# Patient Record
Sex: FEMALE | Race: WHITE | Employment: FULL TIME | URBAN - NONMETROPOLITAN AREA
[De-identification: names, ages, dates, MRNs, and addresses within clinical notes are randomized per-mention and may not be internally consistent; named-entity substitution may affect disease eponyms.]

---

## 2018-06-11 ENCOUNTER — TELEPHONE (OUTPATIENT)
Dept: ORTHOPEDIC SURGERY | Age: 63
End: 2018-06-11

## 2018-06-19 ENCOUNTER — OFFICE VISIT (OUTPATIENT)
Dept: ORTHOPEDIC SURGERY | Age: 63
End: 2018-06-19
Payer: COMMERCIAL

## 2018-06-19 VITALS
TEMPERATURE: 98.3 F | HEIGHT: 66 IN | RESPIRATION RATE: 18 BRPM | HEART RATE: 66 BPM | BODY MASS INDEX: 27.32 KG/M2 | DIASTOLIC BLOOD PRESSURE: 95 MMHG | SYSTOLIC BLOOD PRESSURE: 150 MMHG | WEIGHT: 170 LBS

## 2018-06-19 DIAGNOSIS — M17.11 PRIMARY OSTEOARTHRITIS OF RIGHT KNEE: Primary | ICD-10-CM

## 2018-06-19 PROCEDURE — 99203 OFFICE O/P NEW LOW 30 MIN: CPT | Performed by: ORTHOPAEDIC SURGERY

## 2019-02-14 ENCOUNTER — INITIAL CONSULT (OUTPATIENT)
Dept: SURGERY | Age: 64
End: 2019-02-14

## 2019-02-14 VITALS
SYSTOLIC BLOOD PRESSURE: 136 MMHG | HEIGHT: 66 IN | WEIGHT: 170 LBS | DIASTOLIC BLOOD PRESSURE: 76 MMHG | HEART RATE: 76 BPM | OXYGEN SATURATION: 95 % | BODY MASS INDEX: 27.32 KG/M2

## 2019-02-14 DIAGNOSIS — L98.7 EXCESS SKIN OF ARM: ICD-10-CM

## 2019-02-14 DIAGNOSIS — E88.1 LIPODYSTROPHY: Primary | ICD-10-CM

## 2019-02-14 PROCEDURE — MISCPX MISCPX: Performed by: PLASTIC SURGERY

## 2019-06-06 ENCOUNTER — OFFICE VISIT (OUTPATIENT)
Dept: FAMILY MEDICINE CLINIC | Age: 64
End: 2019-06-06
Payer: MEDICARE

## 2019-06-06 VITALS
HEART RATE: 74 BPM | WEIGHT: 174 LBS | OXYGEN SATURATION: 98 % | TEMPERATURE: 97.4 F | BODY MASS INDEX: 28.08 KG/M2 | DIASTOLIC BLOOD PRESSURE: 78 MMHG | SYSTOLIC BLOOD PRESSURE: 124 MMHG

## 2019-06-06 DIAGNOSIS — Z12.39 ENCOUNTER FOR OTHER SCREENING FOR MALIGNANT NEOPLASM OF BREAST: Primary | ICD-10-CM

## 2019-06-06 DIAGNOSIS — J01.90 ACUTE NON-RECURRENT SINUSITIS, UNSPECIFIED LOCATION: ICD-10-CM

## 2019-06-06 PROCEDURE — 99213 OFFICE O/P EST LOW 20 MIN: CPT | Performed by: FAMILY MEDICINE

## 2019-06-06 RX ORDER — CYCLOSPORINE 0.5 MG/ML
1 EMULSION OPHTHALMIC 2 TIMES DAILY
COMMUNITY

## 2019-06-06 RX ORDER — AZITHROMYCIN 250 MG/1
250 TABLET, FILM COATED ORAL SEE ADMIN INSTRUCTIONS
Qty: 6 TABLET | Refills: 0 | Status: SHIPPED | OUTPATIENT
Start: 2019-06-06 | End: 2019-06-11

## 2019-06-06 ASSESSMENT — PATIENT HEALTH QUESTIONNAIRE - PHQ9
SUM OF ALL RESPONSES TO PHQ9 QUESTIONS 1 & 2: 0
SUM OF ALL RESPONSES TO PHQ QUESTIONS 1-9: 0
SUM OF ALL RESPONSES TO PHQ QUESTIONS 1-9: 0
2. FEELING DOWN, DEPRESSED OR HOPELESS: 0
1. LITTLE INTEREST OR PLEASURE IN DOING THINGS: 0

## 2019-06-06 NOTE — PROGRESS NOTES
6/6/19    CC:   Chief Complaint   Patient presents with    Sinus Problem        S: patient here with one week history of sinus s/s, allergies. No fevers. OTC meds not helping. Needs rx for mammogram.    No past medical history on file. Family History   Problem Relation Age of Onset    Stroke Mother     Thyroid Disease Mother     Heart Failure Brother        Past Surgical History:   Procedure Laterality Date    APPENDECTOMY      ARM SURGERY Left     cyst    LIPOSUCTION Bilateral     Upper extremity       Social History     Tobacco Use    Smoking status: Never Smoker    Smokeless tobacco: Never Used   Substance Use Topics    Alcohol use: Yes     Comment: occasional wine    Drug use: No       ROS:  No CP, No palpitations,   No sob, No cough,   No abd pain, No heartburn,   No headaches,   No tingling, No numbness, No weakness,   No bowel changes, No hematochezia, No melena,  No bladder changes, No hematuria  No skin rashes, No skin lesions. No vision changes, No hearing changes,   No polyuria, polydipsia, polyphagia. Stable mood. ROS otherwise negative unless as listed in HPI. Chart reviewed and updated where appropriate for PMH, Fam, and Soc Hx. Physical Exam   /78   Pulse 74   Temp 97.4 °F (36.3 °C)   Wt 174 lb (78.9 kg)   SpO2 98%   BMI 28.08 kg/m²   Wt Readings from Last 3 Encounters:   06/06/19 174 lb (78.9 kg)   02/14/19 170 lb (77.1 kg)   06/19/18 170 lb (77.1 kg)       Constitutional:    She is oriented to person, place, and time. She appears well-developed and well-nourished. HENT:    Right Ear: Tympanic membrane, external ear and ear canal normal.    Left Ear: Tympanic membrane, external ear and ear canal normal.    Nose: congestion. Mouth/Throat: erythema, no exudate. Eyes:    Conjunctivae are normal.    Pupils are equal, round, and reactive to light. EOMI. Neck:    Normal range of motion. No thyromegaly or nodules noted. No bruit.   Cardiovascular: Normal rate, regular rhythm and normal heart sounds. No murmur. No gallop and no friction rub. Pulmonary/Chest:    Effort normal and breath sounds normal.    No wheezes. No rales or rhonchi. Abdominal:    Soft. Bowel sounds are normal.    No distension. No tenderness. Musculoskeletal:    Normal range of motion. No joint swelling noted. No peripheral edema. Neurological:    She is alert and oriented to person, place, and time. Motor and sensation grossly intact. Normal Gait. Skin:    Skin is warm and dry. No rashes, lesions. Psychiatric:    She has a normal mood and affect. Normal groom and dress. Current Outpatient Medications on File Prior to Visit   Medication Sig Dispense Refill    cycloSPORINE (RESTASIS) 0.05 % ophthalmic emulsion 1 drop 2 times daily      levofloxacin (LEVAQUIN) 750 MG tablet Take 750 mg by mouth daily X 14 days      acetaminophen (TYLENOL) 325 MG tablet Take 2 tablets by mouth every 6 hours as needed for Pain or Fever 120 tablet 1     No current facility-administered medications on file prior to visit. Patient Active Problem List   Diagnosis Code    Varicose veins I83.90    Cellulitis of left upper extremity L03.114    Leukocytosis D72.829    Fever R50.9    SIRS (systemic inflammatory response syndrome) (HCC) R65.10    Elevated blood pressure reading R03.0        Assessment / Price Petty was seen today for sinus problem. Diagnoses and all orders for this visit:    Encounter for other screening for malignant neoplasm of breast  -     GALI DIGITAL DIAGNOSTIC W OR WO CAD BILATERAL; Future    Acute non-recurrent sinusitis, unspecified location    Other orders  -     azithromycin (ZITHROMAX) 250 MG tablet; Take 1 tablet by mouth See Admin Instructions for 5 days 500mg on day 1 followed by 250mg on days 2 - 5  -     dexamethasone (DECADRON) 0.75 MG tablet; Take 1 tablet by mouth three times daily for 5 days    Reviewed Pmhx, meds.  Start zpak/decadron. Rx for mammogram given. Recheck one week prn. No follow-ups on file. Patient counseled to follow up sooner or seek more acute care if symptoms worsening. Electronically signed by Carrie Davis DO on 6/6/2019    This note may have been created using dictation software.  Efforts were made to reduce grammatical or syntax errors, but some may persist.

## 2019-10-08 ENCOUNTER — OFFICE VISIT (OUTPATIENT)
Dept: FAMILY MEDICINE CLINIC | Age: 64
End: 2019-10-08
Payer: MEDICARE

## 2019-10-08 VITALS
SYSTOLIC BLOOD PRESSURE: 140 MMHG | HEIGHT: 66 IN | WEIGHT: 174 LBS | BODY MASS INDEX: 27.97 KG/M2 | OXYGEN SATURATION: 97 % | HEART RATE: 97 BPM | TEMPERATURE: 98.6 F | DIASTOLIC BLOOD PRESSURE: 82 MMHG

## 2019-10-08 DIAGNOSIS — R06.02 SHORTNESS OF BREATH: Primary | ICD-10-CM

## 2019-10-08 DIAGNOSIS — E78.5 HYPERLIPIDEMIA, UNSPECIFIED HYPERLIPIDEMIA TYPE: ICD-10-CM

## 2019-10-08 DIAGNOSIS — E55.9 VITAMIN D DEFICIENCY: ICD-10-CM

## 2019-10-08 DIAGNOSIS — Z12.31 BREAST CANCER SCREENING BY MAMMOGRAM: ICD-10-CM

## 2019-10-08 DIAGNOSIS — R73.01 IMPAIRED FASTING GLUCOSE: ICD-10-CM

## 2019-10-08 DIAGNOSIS — E03.9 HYPOTHYROIDISM, UNSPECIFIED TYPE: ICD-10-CM

## 2019-10-08 DIAGNOSIS — I10 ESSENTIAL HYPERTENSION: ICD-10-CM

## 2019-10-08 DIAGNOSIS — E53.8 VITAMIN B12 DEFICIENCY: ICD-10-CM

## 2019-10-08 PROCEDURE — 1123F ACP DISCUSS/DSCN MKR DOCD: CPT | Performed by: FAMILY MEDICINE

## 2019-10-08 PROCEDURE — 4040F PNEUMOC VAC/ADMIN/RCVD: CPT | Performed by: FAMILY MEDICINE

## 2019-10-08 PROCEDURE — 3017F COLORECTAL CA SCREEN DOC REV: CPT | Performed by: FAMILY MEDICINE

## 2019-10-08 PROCEDURE — G8419 CALC BMI OUT NRM PARAM NOF/U: HCPCS | Performed by: FAMILY MEDICINE

## 2019-10-08 PROCEDURE — 90653 IIV ADJUVANT VACCINE IM: CPT | Performed by: FAMILY MEDICINE

## 2019-10-08 PROCEDURE — 1090F PRES/ABSN URINE INCON ASSESS: CPT | Performed by: FAMILY MEDICINE

## 2019-10-08 PROCEDURE — G8482 FLU IMMUNIZE ORDER/ADMIN: HCPCS | Performed by: FAMILY MEDICINE

## 2019-10-08 PROCEDURE — 1036F TOBACCO NON-USER: CPT | Performed by: FAMILY MEDICINE

## 2019-10-08 PROCEDURE — G8427 DOCREV CUR MEDS BY ELIG CLIN: HCPCS | Performed by: FAMILY MEDICINE

## 2019-10-08 PROCEDURE — G0008 ADMIN INFLUENZA VIRUS VAC: HCPCS | Performed by: FAMILY MEDICINE

## 2019-10-08 PROCEDURE — 99214 OFFICE O/P EST MOD 30 MIN: CPT | Performed by: FAMILY MEDICINE

## 2019-10-08 PROCEDURE — 93000 ELECTROCARDIOGRAM COMPLETE: CPT | Performed by: FAMILY MEDICINE

## 2019-10-08 PROCEDURE — G8400 PT W/DXA NO RESULTS DOC: HCPCS | Performed by: FAMILY MEDICINE

## 2019-10-08 RX ORDER — CHLORPHENIRAMINE MALEATE 4 MG/1
TABLET ORAL
COMMUNITY
Start: 2019-02-06 | End: 2019-10-08 | Stop reason: ALTCHOICE

## 2019-10-08 RX ORDER — TRIAMCINOLONE ACETONIDE 0.25 MG/G
CREAM TOPICAL
COMMUNITY
Start: 2019-08-14 | End: 2019-10-08 | Stop reason: ALTCHOICE

## 2019-10-08 RX ORDER — CEFDINIR 300 MG/1
CAPSULE ORAL
COMMUNITY
Start: 2019-02-06 | End: 2019-10-08 | Stop reason: ALTCHOICE

## 2019-10-08 ASSESSMENT — ENCOUNTER SYMPTOMS
CONSTIPATION: 0
DIARRHEA: 0
BACK PAIN: 0
EYE PAIN: 0
ABDOMINAL PAIN: 0
SORE THROAT: 0
SHORTNESS OF BREATH: 1
COUGH: 0
SINUS PAIN: 0

## 2019-10-14 ENCOUNTER — OFFICE VISIT (OUTPATIENT)
Dept: PODIATRY | Age: 64
End: 2019-10-14
Payer: MEDICARE

## 2019-10-14 VITALS — HEIGHT: 66 IN | BODY MASS INDEX: 28.12 KG/M2 | WEIGHT: 175 LBS | RESPIRATION RATE: 18 BRPM

## 2019-10-14 DIAGNOSIS — R26.2 DIFFICULTY WALKING: ICD-10-CM

## 2019-10-14 DIAGNOSIS — M20.42 HAMMER TOE OF LEFT FOOT: Primary | ICD-10-CM

## 2019-10-14 DIAGNOSIS — M79.672 PAIN IN LEFT FOOT: ICD-10-CM

## 2019-10-14 PROCEDURE — G8427 DOCREV CUR MEDS BY ELIG CLIN: HCPCS | Performed by: PODIATRIST

## 2019-10-14 PROCEDURE — G8400 PT W/DXA NO RESULTS DOC: HCPCS | Performed by: PODIATRIST

## 2019-10-14 PROCEDURE — 1090F PRES/ABSN URINE INCON ASSESS: CPT | Performed by: PODIATRIST

## 2019-10-14 PROCEDURE — 1036F TOBACCO NON-USER: CPT | Performed by: PODIATRIST

## 2019-10-14 PROCEDURE — G8482 FLU IMMUNIZE ORDER/ADMIN: HCPCS | Performed by: PODIATRIST

## 2019-10-14 PROCEDURE — G8419 CALC BMI OUT NRM PARAM NOF/U: HCPCS | Performed by: PODIATRIST

## 2019-10-14 PROCEDURE — 4040F PNEUMOC VAC/ADMIN/RCVD: CPT | Performed by: PODIATRIST

## 2019-10-14 PROCEDURE — 3017F COLORECTAL CA SCREEN DOC REV: CPT | Performed by: PODIATRIST

## 2019-10-14 PROCEDURE — 1123F ACP DISCUSS/DSCN MKR DOCD: CPT | Performed by: PODIATRIST

## 2019-10-14 PROCEDURE — 99213 OFFICE O/P EST LOW 20 MIN: CPT | Performed by: PODIATRIST

## 2019-12-16 ENCOUNTER — OFFICE VISIT (OUTPATIENT)
Dept: FAMILY MEDICINE CLINIC | Age: 64
End: 2019-12-16
Payer: MEDICARE

## 2019-12-16 ENCOUNTER — HOSPITAL ENCOUNTER (OUTPATIENT)
Age: 64
Discharge: HOME | End: 2019-12-18
Payer: MEDICARE

## 2019-12-16 VITALS
WEIGHT: 175.4 LBS | TEMPERATURE: 97.5 F | SYSTOLIC BLOOD PRESSURE: 150 MMHG | BODY MASS INDEX: 28.19 KG/M2 | HEIGHT: 66 IN | HEART RATE: 77 BPM | DIASTOLIC BLOOD PRESSURE: 90 MMHG | OXYGEN SATURATION: 96 %

## 2019-12-16 DIAGNOSIS — E03.9 HYPOTHYROIDISM, UNSPECIFIED TYPE: ICD-10-CM

## 2019-12-16 DIAGNOSIS — I10 ESSENTIAL HYPERTENSION: ICD-10-CM

## 2019-12-16 DIAGNOSIS — E53.8 VITAMIN B12 DEFICIENCY: ICD-10-CM

## 2019-12-16 DIAGNOSIS — E55.9 VITAMIN D DEFICIENCY: ICD-10-CM

## 2019-12-16 DIAGNOSIS — Z12.31 BREAST CANCER SCREENING BY MAMMOGRAM: Primary | ICD-10-CM

## 2019-12-16 DIAGNOSIS — E78.5 HYPERLIPIDEMIA, UNSPECIFIED HYPERLIPIDEMIA TYPE: ICD-10-CM

## 2019-12-16 DIAGNOSIS — R73.01 IMPAIRED FASTING GLUCOSE: ICD-10-CM

## 2019-12-16 DIAGNOSIS — R13.19 ESOPHAGEAL DYSPHAGIA: ICD-10-CM

## 2019-12-16 LAB
BASOPHILS ABSOLUTE: 0.11 E9/L (ref 0–0.2)
BASOPHILS RELATIVE PERCENT: 1.3 % (ref 0–2)
EOSINOPHILS ABSOLUTE: 0.33 E9/L (ref 0.05–0.5)
EOSINOPHILS RELATIVE PERCENT: 4 % (ref 0–6)
FOLATE: 14.6 NG/ML (ref 4.8–24.2)
HBA1C MFR BLD: 5.6 % (ref 4–5.6)
HCT VFR BLD CALC: 46.8 % (ref 34–48)
HEMOGLOBIN: 14.3 G/DL (ref 11.5–15.5)
IMMATURE GRANULOCYTES #: 0.01 E9/L
IMMATURE GRANULOCYTES %: 0.1 % (ref 0–5)
LYMPHOCYTES ABSOLUTE: 3.22 E9/L (ref 1.5–4)
LYMPHOCYTES RELATIVE PERCENT: 39.4 % (ref 20–42)
MCH RBC QN AUTO: 29.4 PG (ref 26–35)
MCHC RBC AUTO-ENTMCNC: 30.6 % (ref 32–34.5)
MCV RBC AUTO: 96.1 FL (ref 80–99.9)
MONOCYTES ABSOLUTE: 0.91 E9/L (ref 0.1–0.95)
MONOCYTES RELATIVE PERCENT: 11.1 % (ref 2–12)
NEUTROPHILS ABSOLUTE: 3.6 E9/L (ref 1.8–7.3)
NEUTROPHILS RELATIVE PERCENT: 44.1 % (ref 43–80)
PDW BLD-RTO: 13.5 FL (ref 11.5–15)
PLATELET # BLD: 370 E9/L (ref 130–450)
PMV BLD AUTO: 10.2 FL (ref 7–12)
RBC # BLD: 4.87 E12/L (ref 3.5–5.5)
VITAMIN B-12: 479 PG/ML (ref 211–946)
WBC # BLD: 8.2 E9/L (ref 4.5–11.5)

## 2019-12-16 PROCEDURE — 80061 LIPID PANEL: CPT

## 2019-12-16 PROCEDURE — 1123F ACP DISCUSS/DSCN MKR DOCD: CPT | Performed by: FAMILY MEDICINE

## 2019-12-16 PROCEDURE — G8482 FLU IMMUNIZE ORDER/ADMIN: HCPCS | Performed by: FAMILY MEDICINE

## 2019-12-16 PROCEDURE — 36415 COLL VENOUS BLD VENIPUNCTURE: CPT

## 2019-12-16 PROCEDURE — 1036F TOBACCO NON-USER: CPT | Performed by: FAMILY MEDICINE

## 2019-12-16 PROCEDURE — 82746 ASSAY OF FOLIC ACID SERUM: CPT

## 2019-12-16 PROCEDURE — 83036 HEMOGLOBIN GLYCOSYLATED A1C: CPT

## 2019-12-16 PROCEDURE — 82607 VITAMIN B-12: CPT

## 2019-12-16 PROCEDURE — 84550 ASSAY OF BLOOD/URIC ACID: CPT

## 2019-12-16 PROCEDURE — 83735 ASSAY OF MAGNESIUM: CPT

## 2019-12-16 PROCEDURE — 84439 ASSAY OF FREE THYROXINE: CPT

## 2019-12-16 PROCEDURE — 99214 OFFICE O/P EST MOD 30 MIN: CPT | Performed by: FAMILY MEDICINE

## 2019-12-16 PROCEDURE — 85025 COMPLETE CBC W/AUTO DIFF WBC: CPT

## 2019-12-16 PROCEDURE — 82550 ASSAY OF CK (CPK): CPT

## 2019-12-16 PROCEDURE — 1090F PRES/ABSN URINE INCON ASSESS: CPT | Performed by: FAMILY MEDICINE

## 2019-12-16 PROCEDURE — G8419 CALC BMI OUT NRM PARAM NOF/U: HCPCS | Performed by: FAMILY MEDICINE

## 2019-12-16 PROCEDURE — 4040F PNEUMOC VAC/ADMIN/RCVD: CPT | Performed by: FAMILY MEDICINE

## 2019-12-16 PROCEDURE — 80053 COMPREHEN METABOLIC PANEL: CPT

## 2019-12-16 PROCEDURE — 3017F COLORECTAL CA SCREEN DOC REV: CPT | Performed by: FAMILY MEDICINE

## 2019-12-16 PROCEDURE — G8400 PT W/DXA NO RESULTS DOC: HCPCS | Performed by: FAMILY MEDICINE

## 2019-12-16 PROCEDURE — 82306 VITAMIN D 25 HYDROXY: CPT

## 2019-12-16 PROCEDURE — 84443 ASSAY THYROID STIM HORMONE: CPT

## 2019-12-16 PROCEDURE — G8427 DOCREV CUR MEDS BY ELIG CLIN: HCPCS | Performed by: FAMILY MEDICINE

## 2019-12-16 RX ORDER — FLUTICASONE PROPIONATE 50 MCG
1 SPRAY, SUSPENSION (ML) NASAL DAILY
COMMUNITY
End: 2020-11-11

## 2019-12-16 ASSESSMENT — ENCOUNTER SYMPTOMS
EYE PAIN: 0
SORE THROAT: 0
DIARRHEA: 0
ABDOMINAL PAIN: 0
COUGH: 0
CONSTIPATION: 0
SINUS PAIN: 0
BACK PAIN: 0

## 2019-12-17 LAB
ALBUMIN SERPL-MCNC: 4.3 G/DL (ref 3.5–5.2)
ALP BLD-CCNC: 78 U/L (ref 35–104)
ALT SERPL-CCNC: 15 U/L (ref 0–32)
ANION GAP SERPL CALCULATED.3IONS-SCNC: 14 MMOL/L (ref 7–16)
AST SERPL-CCNC: 20 U/L (ref 0–31)
BILIRUB SERPL-MCNC: 0.3 MG/DL (ref 0–1.2)
BUN BLDV-MCNC: 10 MG/DL (ref 8–23)
CALCIUM SERPL-MCNC: 9.6 MG/DL (ref 8.6–10.2)
CHLORIDE BLD-SCNC: 103 MMOL/L (ref 98–107)
CHOLESTEROL, TOTAL: 216 MG/DL (ref 0–199)
CO2: 24 MMOL/L (ref 22–29)
CREAT SERPL-MCNC: 0.7 MG/DL (ref 0.5–1)
GFR AFRICAN AMERICAN: >60
GFR NON-AFRICAN AMERICAN: >60 ML/MIN/1.73
GLUCOSE BLD-MCNC: 85 MG/DL (ref 74–99)
HDLC SERPL-MCNC: 68 MG/DL
LDL CHOLESTEROL CALCULATED: 133 MG/DL (ref 0–99)
MAGNESIUM: 2.3 MG/DL (ref 1.6–2.6)
POTASSIUM SERPL-SCNC: 4 MMOL/L (ref 3.5–5)
SODIUM BLD-SCNC: 141 MMOL/L (ref 132–146)
T4 FREE: 1.07 NG/DL (ref 0.93–1.7)
TOTAL CK: 36 U/L (ref 20–180)
TOTAL PROTEIN: 7.8 G/DL (ref 6.4–8.3)
TRIGL SERPL-MCNC: 77 MG/DL (ref 0–149)
TSH SERPL DL<=0.05 MIU/L-ACNC: 3.47 UIU/ML (ref 0.27–4.2)
URIC ACID, SERUM: 4.3 MG/DL (ref 2.4–5.7)
VITAMIN D 25-HYDROXY: 24 NG/ML (ref 30–100)
VLDLC SERPL CALC-MCNC: 15 MG/DL

## 2019-12-30 ENCOUNTER — HOSPITAL ENCOUNTER (OUTPATIENT)
Dept: GENERAL RADIOLOGY | Age: 64
Discharge: HOME | End: 2020-01-01
Payer: MEDICARE

## 2019-12-30 PROCEDURE — 6370000000 HC RX 637 (ALT 250 FOR IP): Performed by: RADIOLOGY

## 2019-12-30 PROCEDURE — 74220 X-RAY XM ESOPHAGUS 1CNTRST: CPT

## 2019-12-30 PROCEDURE — 2500000003 HC RX 250 WO HCPCS: Performed by: RADIOLOGY

## 2019-12-30 RX ADMIN — ANTACID/ANTIFLATULENT 1 EACH: 380; 550; 10; 10 GRANULE, EFFERVESCENT ORAL at 12:59

## 2019-12-30 RX ADMIN — BARIUM SULFATE 176 G: 960 POWDER, FOR SUSPENSION ORAL at 12:58

## 2019-12-30 RX ADMIN — BARIUM SULFATE 140 ML: 980 POWDER, FOR SUSPENSION ORAL at 12:59

## 2020-01-03 ENCOUNTER — OFFICE VISIT (OUTPATIENT)
Dept: FAMILY MEDICINE CLINIC | Age: 65
End: 2020-01-03
Payer: MEDICARE

## 2020-01-03 VITALS
HEART RATE: 78 BPM | BODY MASS INDEX: 28.73 KG/M2 | SYSTOLIC BLOOD PRESSURE: 128 MMHG | TEMPERATURE: 97.8 F | WEIGHT: 178 LBS | DIASTOLIC BLOOD PRESSURE: 66 MMHG | OXYGEN SATURATION: 92 %

## 2020-01-03 PROCEDURE — 1123F ACP DISCUSS/DSCN MKR DOCD: CPT | Performed by: FAMILY MEDICINE

## 2020-01-03 PROCEDURE — 1090F PRES/ABSN URINE INCON ASSESS: CPT | Performed by: FAMILY MEDICINE

## 2020-01-03 PROCEDURE — G8482 FLU IMMUNIZE ORDER/ADMIN: HCPCS | Performed by: FAMILY MEDICINE

## 2020-01-03 PROCEDURE — G8417 CALC BMI ABV UP PARAM F/U: HCPCS | Performed by: FAMILY MEDICINE

## 2020-01-03 PROCEDURE — 1036F TOBACCO NON-USER: CPT | Performed by: FAMILY MEDICINE

## 2020-01-03 PROCEDURE — 99214 OFFICE O/P EST MOD 30 MIN: CPT | Performed by: FAMILY MEDICINE

## 2020-01-03 PROCEDURE — 4040F PNEUMOC VAC/ADMIN/RCVD: CPT | Performed by: FAMILY MEDICINE

## 2020-01-03 PROCEDURE — G8400 PT W/DXA NO RESULTS DOC: HCPCS | Performed by: FAMILY MEDICINE

## 2020-01-03 PROCEDURE — 3017F COLORECTAL CA SCREEN DOC REV: CPT | Performed by: FAMILY MEDICINE

## 2020-01-03 PROCEDURE — G8427 DOCREV CUR MEDS BY ELIG CLIN: HCPCS | Performed by: FAMILY MEDICINE

## 2020-01-03 RX ORDER — OMEPRAZOLE 40 MG/1
40 CAPSULE, DELAYED RELEASE ORAL
Qty: 30 CAPSULE | Refills: 5 | Status: SHIPPED
Start: 2020-01-03 | End: 2020-11-11

## 2020-01-03 ASSESSMENT — ENCOUNTER SYMPTOMS
COUGH: 0
EYE PAIN: 0
BACK PAIN: 0
SORE THROAT: 0
DIARRHEA: 0
ABDOMINAL PAIN: 0
SINUS PAIN: 0
CONSTIPATION: 0

## 2020-01-03 NOTE — PROGRESS NOTES
1/3/20    Thejanel Tucker : 1955 Sex: female  Age: 75 y.o. Chief Complaint   Patient presents with    Results       HPI:  75 y.o. female here for PE of chronic medical problems, review UGI report. Review of Systems   Constitutional: Negative for diaphoresis and fever. HENT: Negative for congestion, ear pain, sinus pain and sore throat. Eyes: Negative for pain and visual disturbance. Respiratory: Negative for cough. Cardiovascular: Negative for chest pain and palpitations. Gastrointestinal: Negative for abdominal pain, constipation and diarrhea. Endocrine: Negative for polydipsia. Genitourinary: Negative for dysuria, frequency and vaginal discharge. Musculoskeletal: Negative for arthralgias, back pain and myalgias. Skin: Negative for rash. Allergic/Immunologic: Negative for environmental allergies. Neurological: Negative for dizziness, seizures, syncope and headaches. Hematological: Negative for adenopathy. Psychiatric/Behavioral: Negative for confusion. The patient is not nervous/anxious. Current Outpatient Medications:     omeprazole (PRILOSEC) 40 MG delayed release capsule, Take 1 capsule by mouth every morning (before breakfast), Disp: 30 capsule, Rfl: 5    fluticasone (FLONASE) 50 MCG/ACT nasal spray, 1 spray by Each Nostril route daily, Disp: , Rfl:     cycloSPORINE (RESTASIS) 0.05 % ophthalmic emulsion, 1 drop 2 times daily, Disp: , Rfl:   Allergies   Allergen Reactions    Pseudoephedrine     Psudatabs [Pseudoephedrine Hcl]      Racing heart    Shellfish Allergy     Shellfish-Derived Products      Scallops         No past medical history on file.   Past Surgical History:   Procedure Laterality Date    APPENDECTOMY      ARM SURGERY Left     cyst    LIPOSUCTION Bilateral     Upper extremity     Family History   Problem Relation Age of Onset    Stroke Mother     Thyroid Disease Mother     Heart Failure Brother      Social History     Socioeconomic History    Marital status:      Spouse name: Not on file    Number of children: Not on file    Years of education: Not on file    Highest education level: Not on file   Occupational History    Not on file   Social Needs    Financial resource strain: Not on file    Food insecurity:     Worry: Not on file     Inability: Not on file    Transportation needs:     Medical: Not on file     Non-medical: Not on file   Tobacco Use    Smoking status: Never Smoker    Smokeless tobacco: Never Used   Substance and Sexual Activity    Alcohol use: Yes     Comment: occasional wine    Drug use: No    Sexual activity: Not on file   Lifestyle    Physical activity:     Days per week: Not on file     Minutes per session: Not on file    Stress: Not on file   Relationships    Social connections:     Talks on phone: Not on file     Gets together: Not on file     Attends Faith service: Not on file     Active member of club or organization: Not on file     Attends meetings of clubs or organizations: Not on file     Relationship status: Not on file    Intimate partner violence:     Fear of current or ex partner: Not on file     Emotionally abused: Not on file     Physically abused: Not on file     Forced sexual activity: Not on file   Other Topics Concern    Not on file   Social History Narrative    Not on file       Vitals:    01/03/20 1648   BP: 128/66   Pulse: 78   Temp: 97.8 °F (36.6 °C)   SpO2: 92%   Weight: 178 lb (80.7 kg)       Physical Exam  Constitutional:       Appearance: She is well-developed. HENT:      Head: Normocephalic. Right Ear: Tympanic membrane normal.      Left Ear: Tympanic membrane normal.      Nose: Nose normal.      Mouth/Throat:      Mouth: Mucous membranes are moist.      Pharynx: Oropharynx is clear. Eyes:      Extraocular Movements: Extraocular movements intact. Conjunctiva/sclera: Conjunctivae normal.      Pupils: Pupils are equal, round, and reactive to light.    Neck:

## 2020-07-13 ENCOUNTER — OFFICE VISIT (OUTPATIENT)
Dept: PODIATRY | Age: 65
End: 2020-07-13
Payer: MEDICARE

## 2020-07-13 VITALS — TEMPERATURE: 97 F | SYSTOLIC BLOOD PRESSURE: 136 MMHG | DIASTOLIC BLOOD PRESSURE: 80 MMHG

## 2020-07-13 PROBLEM — M77.42 METATARSALGIA OF LEFT FOOT: Status: ACTIVE | Noted: 2020-07-13

## 2020-07-13 PROCEDURE — G8400 PT W/DXA NO RESULTS DOC: HCPCS | Performed by: PODIATRIST

## 2020-07-13 PROCEDURE — 1123F ACP DISCUSS/DSCN MKR DOCD: CPT | Performed by: PODIATRIST

## 2020-07-13 PROCEDURE — G8417 CALC BMI ABV UP PARAM F/U: HCPCS | Performed by: PODIATRIST

## 2020-07-13 PROCEDURE — 99213 OFFICE O/P EST LOW 20 MIN: CPT | Performed by: PODIATRIST

## 2020-07-13 PROCEDURE — 4040F PNEUMOC VAC/ADMIN/RCVD: CPT | Performed by: PODIATRIST

## 2020-07-13 PROCEDURE — 1036F TOBACCO NON-USER: CPT | Performed by: PODIATRIST

## 2020-07-13 PROCEDURE — 3017F COLORECTAL CA SCREEN DOC REV: CPT | Performed by: PODIATRIST

## 2020-07-13 PROCEDURE — 1090F PRES/ABSN URINE INCON ASSESS: CPT | Performed by: PODIATRIST

## 2020-07-13 PROCEDURE — G8427 DOCREV CUR MEDS BY ELIG CLIN: HCPCS | Performed by: PODIATRIST

## 2020-07-13 NOTE — PROGRESS NOTES
20     Rosemary Aguirre    : 1955   Sex: female    Age: 75 y.o. Patient's PCP/Provider is: Zoe Hammans, DO    Subjective:  Patient is seen today for follow-up regarding recurrent issues with the left second and third digital areas. She was concerned about worsening of her hammertoe issues left foot. She denies any recent injury or change in activities. She wanted to discuss additional treatment options available at this time. Chief Complaint   Patient presents with    Toe Pain       ROS:  Const: Positives and pertinent negatives as per HPI. Musculo: Denies symptoms other than stated above. Neuro: Denies symptoms other than stated above. Skin: Denies symptoms other than stated above. Current Medications:    Current Outpatient Medications:     omeprazole (PRILOSEC) 40 MG delayed release capsule, Take 1 capsule by mouth every morning (before breakfast), Disp: 30 capsule, Rfl: 5    fluticasone (FLONASE) 50 MCG/ACT nasal spray, 1 spray by Each Nostril route daily, Disp: , Rfl:     cycloSPORINE (RESTASIS) 0.05 % ophthalmic emulsion, 1 drop 2 times daily, Disp: , Rfl:     Allergies: Allergies   Allergen Reactions    Pseudoephedrine     Psudatabs [Pseudoephedrine Hcl]      Racing heart    Shellfish Allergy     Shellfish-Derived Products      Scallops         Vitals:    20 1108   BP: 136/80   Temp: 97 °F (36.1 °C)       Exam:  Neurovascular status unchanged. Hammertoe issues noted left second third and fourth toes. Left second and third toes are symptomatic at the plantar MTPJ regions. Mild edema noted without ecchymosis plantar left second and third MTPJ regions. Mild tenderness noted dorsal PIPJ regions left second and third toes. Diagnostic Studies:     Previous x-ray studies were reviewed with patient today. Procedures:    None    Plan Per Assessment  Davide Corcoran was seen today for toe pain.     Diagnoses and all orders for this visit:    Hammer toe of left foot    Metatarsalgia of left foot    Pain in left foot    Difficulty walking      1. Evaluation and management  2. We did review x-rays with patient today. We did recommend obtaining an MRI of the left forefoot to assess for possible plantar plate issues left second/third MTPJ regions. 3. We did discuss appropriate shoe gear to utilize in the interim to allow for continued symptom improvement. 4. Patient will be followed up within 1 to 2 weeks time to review MRI results, we will discuss further treatment options available at that time. Seen By:    Sedrick Mathew DPM    Electronically signed by Sedrick Mathew DPM on 7/13/2020 at 12:00 PM    This note was created using voice recognition software. The note was reviewed however may contain grammatical errors.

## 2020-08-05 ENCOUNTER — TELEPHONE (OUTPATIENT)
Dept: PODIATRY | Age: 65
End: 2020-08-05

## 2020-08-05 NOTE — TELEPHONE ENCOUNTER
Dr. Choco Ellis recommended MRI at [facility]. The  Facility faxed paperwork over stating they have tried to reach the patient several times and have been unsuccessful. I tried calling the patient to speak with her about the MRI. She did not answer and I left a message asking her to return my call.

## 2020-08-14 ENCOUNTER — OFFICE VISIT (OUTPATIENT)
Dept: FAMILY MEDICINE CLINIC | Age: 65
End: 2020-08-14
Payer: MEDICARE

## 2020-08-14 VITALS
WEIGHT: 178 LBS | TEMPERATURE: 97.3 F | BODY MASS INDEX: 28.61 KG/M2 | HEART RATE: 72 BPM | HEIGHT: 66 IN | RESPIRATION RATE: 18 BRPM | OXYGEN SATURATION: 97 %

## 2020-08-14 PROCEDURE — G8417 CALC BMI ABV UP PARAM F/U: HCPCS | Performed by: FAMILY MEDICINE

## 2020-08-14 PROCEDURE — 3017F COLORECTAL CA SCREEN DOC REV: CPT | Performed by: FAMILY MEDICINE

## 2020-08-14 PROCEDURE — 4040F PNEUMOC VAC/ADMIN/RCVD: CPT | Performed by: FAMILY MEDICINE

## 2020-08-14 PROCEDURE — G8400 PT W/DXA NO RESULTS DOC: HCPCS | Performed by: FAMILY MEDICINE

## 2020-08-14 PROCEDURE — G8427 DOCREV CUR MEDS BY ELIG CLIN: HCPCS | Performed by: FAMILY MEDICINE

## 2020-08-14 PROCEDURE — 99213 OFFICE O/P EST LOW 20 MIN: CPT | Performed by: FAMILY MEDICINE

## 2020-08-14 PROCEDURE — 1036F TOBACCO NON-USER: CPT | Performed by: FAMILY MEDICINE

## 2020-08-14 PROCEDURE — 1090F PRES/ABSN URINE INCON ASSESS: CPT | Performed by: FAMILY MEDICINE

## 2020-08-14 PROCEDURE — 1123F ACP DISCUSS/DSCN MKR DOCD: CPT | Performed by: FAMILY MEDICINE

## 2020-08-14 PROCEDURE — 96372 THER/PROPH/DIAG INJ SC/IM: CPT | Performed by: FAMILY MEDICINE

## 2020-08-14 RX ORDER — METHYLPREDNISOLONE 4 MG/1
TABLET ORAL
Qty: 1 KIT | Refills: 0 | Status: SHIPPED
Start: 2020-08-14 | End: 2020-11-11

## 2020-08-14 RX ORDER — METHYLPREDNISOLONE ACETATE 80 MG/ML
60 INJECTION, SUSPENSION INTRA-ARTICULAR; INTRALESIONAL; INTRAMUSCULAR; SOFT TISSUE ONCE
Status: COMPLETED | OUTPATIENT
Start: 2020-08-14 | End: 2020-08-14

## 2020-08-14 RX ADMIN — METHYLPREDNISOLONE ACETATE 60 MG: 80 INJECTION, SUSPENSION INTRA-ARTICULAR; INTRALESIONAL; INTRAMUSCULAR; SOFT TISSUE at 11:10

## 2020-08-14 ASSESSMENT — ENCOUNTER SYMPTOMS: COLOR CHANGE: 1

## 2020-08-14 NOTE — PROGRESS NOTES
20  Meghna العراقي : 1955 Sex: female  Age: 75 y.o. Chief Complaint   Patient presents with   [facility]     Pt states about 2 days. This patient is a 68-year-old white female who presents today with poison ivy on her hands face and upper extremities of about 2 days duration. She was working in her yard and apparently touch the poison ivy plant with resulting poison ivy. Review of Systems   Constitutional: Negative. HENT: Negative. Cardiovascular: Negative. Skin: Positive for color change and rash. Current Outpatient Medications:     methylPREDNISolone (MEDROL DOSEPACK) 4 MG tablet, Take by mouth., Disp: 1 kit, Rfl: 0    omeprazole (PRILOSEC) 40 MG delayed release capsule, Take 1 capsule by mouth every morning (before breakfast), Disp: 30 capsule, Rfl: 5    fluticasone (FLONASE) 50 MCG/ACT nasal spray, 1 spray by Each Nostril route daily, Disp: , Rfl:     cycloSPORINE (RESTASIS) 0.05 % ophthalmic emulsion, 1 drop 2 times daily, Disp: , Rfl:   Allergies   Allergen Reactions    Pseudoephedrine     Psudatabs [Pseudoephedrine Hcl]      Racing heart    Shellfish Allergy     Shellfish-Derived Products      Scallops         No past medical history on file. Past Surgical History:   Procedure Laterality Date    APPENDECTOMY      ARM SURGERY Left     cyst    LIPOSUCTION Bilateral     Upper extremity     Family History   Problem Relation Age of Onset    Stroke Mother     Thyroid Disease Mother     Heart Failure Brother      Social History     Tobacco Use    Smoking status: Never Smoker    Smokeless tobacco: Never Used   Substance Use Topics    Alcohol use: Yes     Comment: occasional wine    Drug use: No        Vitals:    20 1057   Pulse: 72   Resp: 18   Temp: 97.3 °F (36.3 °C)   TempSrc: Temporal   SpO2: 97%   Weight: 178 lb (80.7 kg)   Height: 5' 6\" (1.676 m)       Physical Exam  Vitals signs reviewed.    Constitutional:       Appearance: Normal

## 2020-11-11 ENCOUNTER — OFFICE VISIT (OUTPATIENT)
Dept: PRIMARY CARE CLINIC | Age: 65
End: 2020-11-11
Payer: MEDICARE

## 2020-11-11 VITALS
OXYGEN SATURATION: 97 % | HEART RATE: 71 BPM | SYSTOLIC BLOOD PRESSURE: 132 MMHG | HEIGHT: 66 IN | TEMPERATURE: 97.6 F | BODY MASS INDEX: 28.28 KG/M2 | DIASTOLIC BLOOD PRESSURE: 84 MMHG | WEIGHT: 176 LBS

## 2020-11-11 PROBLEM — M20.42 HAMMER TOE OF LEFT FOOT: Status: RESOLVED | Noted: 2019-10-14 | Resolved: 2020-11-11

## 2020-11-11 PROBLEM — R26.2 DIFFICULTY WALKING: Status: RESOLVED | Noted: 2019-10-14 | Resolved: 2020-11-11

## 2020-11-11 PROBLEM — M79.672 PAIN IN LEFT FOOT: Status: RESOLVED | Noted: 2019-10-14 | Resolved: 2020-11-11

## 2020-11-11 PROBLEM — M77.42 METATARSALGIA OF LEFT FOOT: Status: RESOLVED | Noted: 2020-07-13 | Resolved: 2020-11-11

## 2020-11-11 PROCEDURE — 1123F ACP DISCUSS/DSCN MKR DOCD: CPT | Performed by: FAMILY MEDICINE

## 2020-11-11 PROCEDURE — G8400 PT W/DXA NO RESULTS DOC: HCPCS | Performed by: FAMILY MEDICINE

## 2020-11-11 PROCEDURE — 1090F PRES/ABSN URINE INCON ASSESS: CPT | Performed by: FAMILY MEDICINE

## 2020-11-11 PROCEDURE — 90732 PPSV23 VACC 2 YRS+ SUBQ/IM: CPT | Performed by: FAMILY MEDICINE

## 2020-11-11 PROCEDURE — G0009 ADMIN PNEUMOCOCCAL VACCINE: HCPCS | Performed by: FAMILY MEDICINE

## 2020-11-11 PROCEDURE — 1036F TOBACCO NON-USER: CPT | Performed by: FAMILY MEDICINE

## 2020-11-11 PROCEDURE — G8482 FLU IMMUNIZE ORDER/ADMIN: HCPCS | Performed by: FAMILY MEDICINE

## 2020-11-11 PROCEDURE — 90653 IIV ADJUVANT VACCINE IM: CPT | Performed by: FAMILY MEDICINE

## 2020-11-11 PROCEDURE — 3017F COLORECTAL CA SCREEN DOC REV: CPT | Performed by: FAMILY MEDICINE

## 2020-11-11 PROCEDURE — 4040F PNEUMOC VAC/ADMIN/RCVD: CPT | Performed by: FAMILY MEDICINE

## 2020-11-11 PROCEDURE — G0008 ADMIN INFLUENZA VIRUS VAC: HCPCS | Performed by: FAMILY MEDICINE

## 2020-11-11 PROCEDURE — G8427 DOCREV CUR MEDS BY ELIG CLIN: HCPCS | Performed by: FAMILY MEDICINE

## 2020-11-11 PROCEDURE — 99214 OFFICE O/P EST MOD 30 MIN: CPT | Performed by: FAMILY MEDICINE

## 2020-11-11 PROCEDURE — G8417 CALC BMI ABV UP PARAM F/U: HCPCS | Performed by: FAMILY MEDICINE

## 2020-11-11 RX ORDER — FAMOTIDINE 40 MG/1
40 TABLET, FILM COATED ORAL EVERY EVENING
Qty: 90 TABLET | Refills: 1 | Status: SHIPPED
Start: 2020-11-11 | End: 2021-06-03

## 2020-11-11 NOTE — PROGRESS NOTES
20  Jesus Valdez : 1955 Sex: female  Age: 77 y.o. Chief Complaint   Patient presents with    [facility]     HPI:  77 y.o. female presents today for follow up of recent worsening GERD symptoms. Former patient of Dr. Hodan Kern. Patient's chart, medical, surgical and medication history all reviewed. GERD: Patient complains of heartburn. This has been associated with chest pain, fullness after meals, heartburn, epigastric pain and symptoms primarily relate to meals, and lying down after meals. She denies abdominal bloating, belching, choking on food, deep pressure at base of neck, difficulty swallowing, hematemesis, hoarseness, melena and wheezing. Symptoms have been present for several months. She denies dysphagia. She has not lost weight. She denies melena, hematochezia, hematemesis, and coffee ground emesis. Medical therapy in the past has included proton pump inhibitors. She feels that Omeprazole made her symptoms far worse. She states she had an EGD at , but doesn't have results- told everything was OK and restarted on Omeprazole. Hasn't taken it because of previous lack of success. ROS:  Review of Systems   Constitutional: Negative for chills, fatigue and fever. Respiratory: Negative for cough, shortness of breath and wheezing. Cardiovascular: Negative for chest pain and palpitations. Gastrointestinal: Positive for abdominal pain. Negative for constipation, diarrhea, nausea and vomiting. Musculoskeletal: Negative for arthralgias and back pain. Skin: Negative for rash. Neurological: Negative for dizziness and headaches. Psychiatric/Behavioral: Negative for dysphoric mood. The patient is not nervous/anxious. All other systems reviewed and are negative.        Current Outpatient Medications on File Prior to Visit   Medication Sig Dispense Refill    cycloSPORINE (RESTASIS) 0.05 % ophthalmic emulsion 1 drop 2 times daily       No current facility-administered medications on file prior to visit. Allergies   Allergen Reactions    Pseudoephedrine     Psudatabs [Pseudoephedrine Hcl]      Racing heart    Shellfish Allergy     Shellfish-Derived Products      Scallops         History reviewed. No pertinent past medical history.   Past Surgical History:   Procedure Laterality Date    APPENDECTOMY      ARM SURGERY Left     cyst    LIPOSUCTION Bilateral     Upper extremity     Family History   Problem Relation Age of Onset    Stroke Mother     Thyroid Disease Mother     Heart Failure Brother      Social History     Socioeconomic History    Marital status:      Spouse name: Not on file    Number of children: Not on file    Years of education: Not on file    Highest education level: Not on file   Occupational History    Not on file   Social Needs    Financial resource strain: Not on file    Food insecurity     Worry: Not on file     Inability: Not on file    Transportation needs     Medical: Not on file     Non-medical: Not on file   Tobacco Use    Smoking status: Never Smoker    Smokeless tobacco: Never Used   Substance and Sexual Activity    Alcohol use: Yes     Comment: occasional wine    Drug use: No    Sexual activity: Not on file   Lifestyle    Physical activity     Days per week: Not on file     Minutes per session: Not on file    Stress: Not on file   Relationships    Social connections     Talks on phone: Not on file     Gets together: Not on file     Attends [employer] service: Not on file     Active member of club or organization: Not on file     Attends meetings of clubs or organizations: Not on file     Relationship status: Not on file    Intimate partner violence     Fear of current or ex partner: Not on file     Emotionally abused: Not on file     Physically abused: Not on file     Forced sexual activity: Not on file   Other Topics Concern    Not on file   Social History Narrative    Not on file 12/16/2019    RDW 13.5 12/16/2019    LYMPHOPCT 39.4 12/16/2019    MONOPCT 11.1 12/16/2019    BASOPCT 1.3 12/16/2019    MONOSABS 0.91 12/16/2019    LYMPHSABS 3.22 12/16/2019    EOSABS 0.33 12/16/2019    BASOSABS 0.11 12/16/2019     CMP:    Lab Results   Component Value Date     12/16/2019    K 4.0 12/16/2019     12/16/2019    CO2 24 12/16/2019    BUN 10 12/16/2019    CREATININE 0.7 12/16/2019    GFRAA >60 12/16/2019    LABGLOM >60 12/16/2019    GLUCOSE 85 12/16/2019    PROT 7.8 12/16/2019    LABALBU 4.3 12/16/2019    CALCIUM 9.6 12/16/2019    BILITOT 0.3 12/16/2019    ALKPHOS 78 12/16/2019    AST 20 12/16/2019    ALT 15 12/16/2019     HgBA1c:    Lab Results   Component Value Date    LABA1C 5.6 12/16/2019     Microalbumen/Creatinine ratio:  No components found for: RUCREAT  FLP:    Lab Results   Component Value Date    TRIG 77 12/16/2019    HDL 68 12/16/2019    LDLCALC 133 12/16/2019    LABVLDL 15 12/16/2019     TSH:    Lab Results   Component Value Date    TSH 3.470 12/16/2019        Assessment and Plan:  Angela Simpson was seen today for establish care. Diagnoses and all orders for this visit:    Gastroesophageal reflux disease without esophagitis  -     famotidine (PEPCID) 40 MG tablet; Take 1 tablet by mouth every evening  WIll get records from [facility]. Try H2 instead of PPI. If no improvement, patient to call. Need for influenza vaccination  -     INFLUENZA, TRIV, INACTIVATED, SUBUNIT, ADJUVANTED, 65 YRS AND OLDER, IM, PREFILL SYR, 0.5ML (FLUAD TRIV)    Need for pneumococcal vaccination  -     PNEUMOVAX 23 subcutaneous/IM (Pneumococcal polysaccharide vaccine 23-valent >= 1yo)        Return in about 1 year (around 11/11/2021) for AWV.       Seen By:  Kitty Beckwith DO

## 2020-11-12 ASSESSMENT — ENCOUNTER SYMPTOMS
COUGH: 0
BACK PAIN: 0
NAUSEA: 0
SHORTNESS OF BREATH: 0
VOMITING: 0
WHEEZING: 0
CONSTIPATION: 0
DIARRHEA: 0
ABDOMINAL PAIN: 1

## 2020-12-09 ENCOUNTER — TELEPHONE (OUTPATIENT)
Dept: FAMILY MEDICINE CLINIC | Age: 65
End: 2020-12-09

## 2020-12-09 NOTE — TELEPHONE ENCOUNTER
Pt would like to add hormone testing per her Dermatologist. Is the order in for her yrly labs?   [phone]

## 2020-12-28 ENCOUNTER — TELEPHONE (OUTPATIENT)
Dept: PRIMARY CARE CLINIC | Age: 65
End: 2020-12-28

## 2021-01-04 ENCOUNTER — OFFICE VISIT (OUTPATIENT)
Dept: FAMILY MEDICINE CLINIC | Age: 66
End: 2021-01-04
Payer: MEDICARE

## 2021-01-04 VITALS
HEART RATE: 76 BPM | HEIGHT: 66 IN | SYSTOLIC BLOOD PRESSURE: 126 MMHG | WEIGHT: 172 LBS | DIASTOLIC BLOOD PRESSURE: 76 MMHG | RESPIRATION RATE: 18 BRPM | BODY MASS INDEX: 27.64 KG/M2 | TEMPERATURE: 97.8 F | OXYGEN SATURATION: 97 %

## 2021-01-04 DIAGNOSIS — L65.9 HAIR LOSS: ICD-10-CM

## 2021-01-04 DIAGNOSIS — H53.8 BLURRED VISION: ICD-10-CM

## 2021-01-04 DIAGNOSIS — M19.019 ARTHRITIS OF STERNOCLAVICULAR JOINT: ICD-10-CM

## 2021-01-04 DIAGNOSIS — M79.605 BILATERAL LEG PAIN: Primary | ICD-10-CM

## 2021-01-04 DIAGNOSIS — R42 DIZZINESS: ICD-10-CM

## 2021-01-04 DIAGNOSIS — M79.604 BILATERAL LEG PAIN: Primary | ICD-10-CM

## 2021-01-04 PROCEDURE — 1090F PRES/ABSN URINE INCON ASSESS: CPT | Performed by: PHYSICIAN ASSISTANT

## 2021-01-04 PROCEDURE — 99214 OFFICE O/P EST MOD 30 MIN: CPT | Performed by: PHYSICIAN ASSISTANT

## 2021-01-04 PROCEDURE — G8482 FLU IMMUNIZE ORDER/ADMIN: HCPCS | Performed by: PHYSICIAN ASSISTANT

## 2021-01-04 PROCEDURE — 1036F TOBACCO NON-USER: CPT | Performed by: PHYSICIAN ASSISTANT

## 2021-01-04 PROCEDURE — G8427 DOCREV CUR MEDS BY ELIG CLIN: HCPCS | Performed by: PHYSICIAN ASSISTANT

## 2021-01-04 PROCEDURE — G8417 CALC BMI ABV UP PARAM F/U: HCPCS | Performed by: PHYSICIAN ASSISTANT

## 2021-01-04 PROCEDURE — G8400 PT W/DXA NO RESULTS DOC: HCPCS | Performed by: PHYSICIAN ASSISTANT

## 2021-01-04 PROCEDURE — 1123F ACP DISCUSS/DSCN MKR DOCD: CPT | Performed by: PHYSICIAN ASSISTANT

## 2021-01-04 PROCEDURE — 3017F COLORECTAL CA SCREEN DOC REV: CPT | Performed by: PHYSICIAN ASSISTANT

## 2021-01-04 PROCEDURE — 4040F PNEUMOC VAC/ADMIN/RCVD: CPT | Performed by: PHYSICIAN ASSISTANT

## 2021-01-04 NOTE — PROGRESS NOTES
Unless otherwise stated in this report the patient's positive and negative responses for review of systems for constitutional, eyes, ENT, cardiovascular, respiratory, gastrointestinal, neurological, , musculoskeletal, and integument systems and related systems to the presenting problem are either stated in the history of present illness or were not pertinent or were negative for the symptoms and/or complaints related to the presenting medical problem. Positives and pertinent negatives as per HPI. All others reviewed and are negative. PMH:   No past medical history on file. Past Surgical History:   Procedure Laterality Date    APPENDECTOMY      ARM SURGERY Left     cyst    LIPOSUCTION Bilateral     Upper extremity       Family History   Problem Relation Age of Onset    Stroke Mother     Thyroid Disease Mother     Heart Failure Brother        Medications:     Current Outpatient Medications:     famotidine (PEPCID) 40 MG tablet, Take 1 tablet by mouth every evening, Disp: 90 tablet, Rfl: 1    cycloSPORINE (RESTASIS) 0.05 % ophthalmic emulsion, 1 drop 2 times daily, Disp: , Rfl:     Allergies: Allergies   Allergen Reactions    Pseudoephedrine     Psudatabs [Pseudoephedrine Hcl]      Racing heart    Shellfish Allergy     Shellfish-Derived Products      Scallops         Social History:     Social History     Tobacco Use    Smoking status: Never Smoker    Smokeless tobacco: Never Used   Substance Use Topics    Alcohol use: Yes     Comment: occasional wine    Drug use: No       Patient lives at home. Physical Exam:     Vitals:    01/04/21 1229   BP: 126/76   Pulse: 76   Resp: 18   Temp: 97.8 °F (36.6 °C)   SpO2: 97%   Weight: 172 lb (78 kg)   Height: 5' 6\" (1.676 m)       Exam:  Physical Exam  Nurses note and vital signs reviewed and patient is not hypoxic. General: The patient appears well and in no apparent distress. Patient is resting comfortably on cart. Skin: Warm, dry, no pallor noted. There is no rash noted. Head: Normocephalic, atraumatic. Eye: Normal conjunctiva  Ears, Nose, Mouth, and Throat: Oral mucosa is moist  Cardiovascular: Regular Rate and Rhythm  Respiratory: Patient is in no distress, no accessory muscle use, lungs are clear to auscultation, no wheezing, crackles or rhonchi. Sternoclavicular area is noted to be prominent on the right side compared to the left, there is no fluctuance or induration, no erythema  Musculoskeletal: There is very minimal swelling to the right knee. The patient had no significant laxity noted. No significant tenderness. Pulses intact. No cyanosis or mottling. Neurological: A&O x4, normal speech      Testing:     Us Dup Lower Extremities Bilateral Venous    Result Date: 1/4/2021  EXAMINATION: DUPLEX VENOUS ULTRASOUND OF THE BILATERAL LOWER EXTREMITIES, 1/4/2021 12:55 pm TECHNIQUE: Duplex ultrasound and Doppler images were obtained of the bilateral lower extremities COMPARISON: None. HISTORY: ORDERING SYSTEM PROVIDED HISTORY: Bilateral leg pain TECHNOLOGIST PROVIDED HISTORY: Reason for exam:->bilateral leg pain FINDINGS: The visualized veins of the bilateral lower extremities are patent and free of echogenic thrombus. The veins are normally compressible and have normal phasic flow. No evidence of DVT in either lower extremity. Medical Decision Making:     Vital signs reviewed    Past medical history reviewed. Allergies reviewed. Medications reviewed. Patient on arrival does not appear to be in any apparent distress or discomfort. The patient will have an ultrasound of the bilateral lower extremities. The patient had ultrasound of the bilateral lower extremities that did not show any evidence of DVT in either lower extremity. The patient will continue to monitor signs symptoms. Does have a follow-up appoint with PCP on 1/19/2021. The patient also has laboratory studies that are pending at this time. The patient will get the laboratory studies performed. The patient will call with any questions or concerns. Clinical Impression:   [place] was seen today for leg pain. Diagnoses and all orders for this visit:    Bilateral leg pain  -     US DUP LOWER EXTREMITIES BILATERAL VENOUS; Future    Arthritis of sternoclavicular joint    Hair loss    Dizziness    Blurred vision        The patient is to call for any concerns or return if any of the signs or symptoms worsen. The patient is to follow-up with PCP in the next 2-3 days for repeat evaluation repeat assessment or go directly to the emergency department.      SIGNATURE: Adriana Perez III, PA-C

## 2021-01-11 DIAGNOSIS — E55.9 VITAMIN D INSUFFICIENCY: ICD-10-CM

## 2021-01-11 DIAGNOSIS — I10 ESSENTIAL HYPERTENSION: ICD-10-CM

## 2021-01-11 DIAGNOSIS — R73.01 IMPAIRED FASTING GLUCOSE: ICD-10-CM

## 2021-01-11 LAB
ALBUMIN SERPL-MCNC: 4.3 G/DL (ref 3.5–5.2)
ALP BLD-CCNC: 80 U/L (ref 35–104)
ALT SERPL-CCNC: 15 U/L (ref 0–32)
ANION GAP SERPL CALCULATED.3IONS-SCNC: 14 MMOL/L (ref 7–16)
AST SERPL-CCNC: 18 U/L (ref 0–31)
BACTERIA: ABNORMAL /HPF
BASOPHILS ABSOLUTE: 0.1 E9/L (ref 0–0.2)
BASOPHILS RELATIVE PERCENT: 1.6 % (ref 0–2)
BILIRUB SERPL-MCNC: 0.3 MG/DL (ref 0–1.2)
BILIRUBIN URINE: NEGATIVE
BLOOD, URINE: ABNORMAL
BUN BLDV-MCNC: 12 MG/DL (ref 8–23)
CALCIUM SERPL-MCNC: 9.5 MG/DL (ref 8.6–10.2)
CHLORIDE BLD-SCNC: 107 MMOL/L (ref 98–107)
CHOLESTEROL, TOTAL: 239 MG/DL (ref 0–199)
CLARITY: CLEAR
CO2: 21 MMOL/L (ref 22–29)
COLOR: YELLOW
CREAT SERPL-MCNC: 0.7 MG/DL (ref 0.5–1)
EOSINOPHILS ABSOLUTE: 0.33 E9/L (ref 0.05–0.5)
EOSINOPHILS RELATIVE PERCENT: 5.4 % (ref 0–6)
GFR AFRICAN AMERICAN: >60
GFR NON-AFRICAN AMERICAN: >60 ML/MIN/1.73
GLUCOSE BLD-MCNC: 83 MG/DL (ref 74–99)
GLUCOSE URINE: NEGATIVE MG/DL
HBA1C MFR BLD: 5.3 % (ref 4–5.6)
HCT VFR BLD CALC: 49.8 % (ref 34–48)
HDLC SERPL-MCNC: 73 MG/DL
HEMOGLOBIN: 15.5 G/DL (ref 11.5–15.5)
IMMATURE GRANULOCYTES #: 0.01 E9/L
IMMATURE GRANULOCYTES %: 0.2 % (ref 0–5)
KETONES, URINE: NEGATIVE MG/DL
LDL CHOLESTEROL CALCULATED: 146 MG/DL (ref 0–99)
LEUKOCYTE ESTERASE, URINE: ABNORMAL
LYMPHOCYTES ABSOLUTE: 2.53 E9/L (ref 1.5–4)
LYMPHOCYTES RELATIVE PERCENT: 41.3 % (ref 20–42)
MCH RBC QN AUTO: 29.9 PG (ref 26–35)
MCHC RBC AUTO-ENTMCNC: 31.1 % (ref 32–34.5)
MCV RBC AUTO: 96 FL (ref 80–99.9)
MONOCYTES ABSOLUTE: 0.85 E9/L (ref 0.1–0.95)
MONOCYTES RELATIVE PERCENT: 13.9 % (ref 2–12)
NEUTROPHILS ABSOLUTE: 2.31 E9/L (ref 1.8–7.3)
NEUTROPHILS RELATIVE PERCENT: 37.6 % (ref 43–80)
NITRITE, URINE: NEGATIVE
PDW BLD-RTO: 12.7 FL (ref 11.5–15)
PH UA: 6 (ref 5–9)
PLATELET # BLD: 359 E9/L (ref 130–450)
PMV BLD AUTO: 10.2 FL (ref 7–12)
POTASSIUM SERPL-SCNC: 4.1 MMOL/L (ref 3.5–5)
PROTEIN UA: NEGATIVE MG/DL
RBC # BLD: 5.19 E12/L (ref 3.5–5.5)
RBC UA: ABNORMAL /HPF (ref 0–2)
SODIUM BLD-SCNC: 142 MMOL/L (ref 132–146)
SPECIFIC GRAVITY UA: 1.02 (ref 1–1.03)
TOTAL PROTEIN: 7.7 G/DL (ref 6.4–8.3)
TRIGL SERPL-MCNC: 101 MG/DL (ref 0–149)
TSH SERPL DL<=0.05 MIU/L-ACNC: 3.48 UIU/ML (ref 0.27–4.2)
UROBILINOGEN, URINE: 0.2 E.U./DL
VITAMIN D 25-HYDROXY: 23 NG/ML (ref 30–100)
VLDLC SERPL CALC-MCNC: 20 MG/DL
WBC # BLD: 6.1 E9/L (ref 4.5–11.5)
WBC UA: ABNORMAL /HPF (ref 0–5)

## 2021-01-19 ENCOUNTER — OFFICE VISIT (OUTPATIENT)
Dept: PRIMARY CARE CLINIC | Age: 66
End: 2021-01-19
Payer: MEDICARE

## 2021-01-19 VITALS
DIASTOLIC BLOOD PRESSURE: 88 MMHG | WEIGHT: 175 LBS | HEART RATE: 63 BPM | TEMPERATURE: 97.8 F | HEIGHT: 66 IN | BODY MASS INDEX: 28.12 KG/M2 | SYSTOLIC BLOOD PRESSURE: 124 MMHG | OXYGEN SATURATION: 95 %

## 2021-01-19 DIAGNOSIS — L65.9 THINNING HAIR: ICD-10-CM

## 2021-01-19 DIAGNOSIS — M25.50 POLYARTHRALGIA: ICD-10-CM

## 2021-01-19 DIAGNOSIS — E55.9 VITAMIN D INSUFFICIENCY: ICD-10-CM

## 2021-01-19 DIAGNOSIS — R53.83 OTHER FATIGUE: Primary | ICD-10-CM

## 2021-01-19 PROCEDURE — 1036F TOBACCO NON-USER: CPT | Performed by: FAMILY MEDICINE

## 2021-01-19 PROCEDURE — 3017F COLORECTAL CA SCREEN DOC REV: CPT | Performed by: FAMILY MEDICINE

## 2021-01-19 PROCEDURE — 99214 OFFICE O/P EST MOD 30 MIN: CPT | Performed by: FAMILY MEDICINE

## 2021-01-19 PROCEDURE — 1123F ACP DISCUSS/DSCN MKR DOCD: CPT | Performed by: FAMILY MEDICINE

## 2021-01-19 PROCEDURE — G8400 PT W/DXA NO RESULTS DOC: HCPCS | Performed by: FAMILY MEDICINE

## 2021-01-19 PROCEDURE — 1090F PRES/ABSN URINE INCON ASSESS: CPT | Performed by: FAMILY MEDICINE

## 2021-01-19 PROCEDURE — 4040F PNEUMOC VAC/ADMIN/RCVD: CPT | Performed by: FAMILY MEDICINE

## 2021-01-19 PROCEDURE — G8482 FLU IMMUNIZE ORDER/ADMIN: HCPCS | Performed by: FAMILY MEDICINE

## 2021-01-19 PROCEDURE — G8417 CALC BMI ABV UP PARAM F/U: HCPCS | Performed by: FAMILY MEDICINE

## 2021-01-19 PROCEDURE — G8427 DOCREV CUR MEDS BY ELIG CLIN: HCPCS | Performed by: FAMILY MEDICINE

## 2021-01-19 ASSESSMENT — PATIENT HEALTH QUESTIONNAIRE - PHQ9: SUM OF ALL RESPONSES TO PHQ QUESTIONS 1-9: 0

## 2021-01-19 ASSESSMENT — ENCOUNTER SYMPTOMS
NAUSEA: 0
DIARRHEA: 0
BACK PAIN: 0
ABDOMINAL PAIN: 1
WHEEZING: 0
CONSTIPATION: 0
VOMITING: 0
COUGH: 0
SHORTNESS OF BREATH: 0

## 2021-01-19 NOTE — PROGRESS NOTES
21  Negro Hallmark : 1955 Sex: female  Age: 77 y.o. Chief Complaint   Patient presents with    Discuss Labs    Fatigue     HPI:  77 y.o. female presents today for acute visit due to new onset fatigue and R leg pain. Patient's chart, medical, surgical and medication history all reviewed. She notes symptoms started around [place]. Symptoms include thinning hair, fatigue, and severe leg pain with swelling and warmth. She was seen through [facility] on [facility] for these symptoms and had howard venous duplex US without evidence of DVT. She notes mild improvement in symptoms, but still not feeling like herself. Started taking new vitamins from Dr. Renu Todd office. ROS:  Review of Systems   Constitutional: Positive for fatigue. Negative for chills and fever. Eyes: Negative for visual disturbance. Respiratory: Negative for cough, shortness of breath and wheezing. Cardiovascular: Positive for leg swelling. Negative for chest pain and palpitations. Gastrointestinal: Positive for abdominal pain. Negative for constipation, diarrhea, nausea and vomiting. Endocrine:        Thinning hair   Musculoskeletal: Positive for arthralgias. Negative for back pain. Skin: Negative for rash. Neurological: Negative for dizziness and headaches. Psychiatric/Behavioral: Negative for dysphoric mood. The patient is not nervous/anxious. All other systems reviewed and are negative. Current Outpatient Medications on File Prior to Visit   Medication Sig Dispense Refill    famotidine (PEPCID) 40 MG tablet Take 1 tablet by mouth every evening 90 tablet 1    cycloSPORINE (RESTASIS) 0.05 % ophthalmic emulsion 1 drop 2 times daily       No current facility-administered medications on file prior to visit.         Allergies   Allergen Reactions    Pseudoephedrine     Psudatabs [Pseudoephedrine Hcl]      Racing heart    Shellfish Allergy     Shellfish-Derived Products      Scallops         History reviewed. No pertinent past medical history. Past Surgical History:   Procedure Laterality Date    APPENDECTOMY      ARM SURGERY Left     cyst    LIPOSUCTION Bilateral     Upper extremity     Family History   Problem Relation Age of Onset    Stroke Mother     Thyroid Disease Mother     Heart Failure Brother      Social History     Socioeconomic History    Marital status:      Spouse name: Not on file    Number of children: Not on file    Years of education: Not on file    Highest education level: Not on file   Occupational History    Not on file   Social Needs    Financial resource strain: Not on file    Food insecurity     Worry: Not on file     Inability: Not on file    Transportation needs     Medical: Not on file     Non-medical: Not on file   Tobacco Use    Smoking status: Never Smoker    Smokeless tobacco: Never Used   Substance and Sexual Activity    Alcohol use: Yes     Comment: occasional wine    Drug use: No    Sexual activity: Not on file   Lifestyle    Physical activity     Days per week: Not on file     Minutes per session: Not on file    Stress: Not on file   Relationships    Social connections     Talks on phone: Not on file     Gets together: Not on file     Attends Taoism service: Not on file     Active member of club or organization: Not on file     Attends meetings of clubs or organizations: Not on file     Relationship status: Not on file    Intimate partner violence     Fear of current or ex partner: Not on file     Emotionally abused: Not on file     Physically abused: Not on file     Forced sexual activity: Not on file   Other Topics Concern    Not on file   Social History Narrative    Not on file       Vitals:    01/19/21 1602   BP: 124/88   Pulse: 63   Temp: 97.8 °F (36.6 °C)   SpO2: 95%   Weight: 175 lb (79.4 kg)   Height: 5' 6\" (1.676 m)       Physical Exam:  Physical Exam  Vitals signs and nursing note reviewed.    Constitutional:       General: She is not in acute distress. Appearance: Normal appearance. She is well-developed and normal weight. She is not ill-appearing. HENT:      Head: Normocephalic and atraumatic. Right Ear: Hearing and external ear normal.      Left Ear: Hearing and external ear normal.      Nose:      Comments: Wearing mask  Eyes:      General: Lids are normal. No scleral icterus. Extraocular Movements: Extraocular movements intact. Conjunctiva/sclera: Conjunctivae normal.   Neck:      Musculoskeletal: Normal range of motion and neck supple. Thyroid: No thyromegaly. Cardiovascular:      Rate and Rhythm: Normal rate and regular rhythm. Heart sounds: Normal heart sounds. No murmur. Pulmonary:      Effort: Pulmonary effort is normal. No respiratory distress. Breath sounds: Normal breath sounds. No wheezing. Musculoskeletal: Normal range of motion. General: No tenderness or deformity. Right lower leg: No edema. Left lower leg: No edema. Lymphadenopathy:      Cervical: No cervical adenopathy. Skin:     General: Skin is warm and dry. Findings: No rash. Neurological:      General: No focal deficit present. Mental Status: She is alert and oriented to person, place, and time. Gait: Gait normal.   Psychiatric:         Mood and Affect: Mood and affect normal.         Speech: Speech normal.         Behavior: Behavior normal.         Thought Content:  Thought content normal.         Labs:  CBC with Differential:    Lab Results   Component Value Date    WBC 6.1 01/11/2021    RBC 5.19 01/11/2021    HGB 15.5 01/11/2021    HCT 49.8 01/11/2021     01/11/2021    MCV 96.0 01/11/2021    MCH 29.9 01/11/2021    MCHC 31.1 01/11/2021    RDW 12.7 01/11/2021    LYMPHOPCT 41.3 01/11/2021    MONOPCT 13.9 01/11/2021    BASOPCT 1.6 01/11/2021    MONOSABS 0.85 01/11/2021    LYMPHSABS 2.53 01/11/2021    EOSABS 0.33 01/11/2021    BASOSABS 0.10 01/11/2021     CMP:    Lab Results   Component Value Date     01/11/2021    K 4.1 01/11/2021     01/11/2021    CO2 21 01/11/2021    BUN 12 01/11/2021    CREATININE 0.7 01/11/2021    GFRAA >60 01/11/2021    LABGLOM >60 01/11/2021    GLUCOSE 83 01/11/2021    PROT 7.7 01/11/2021    LABALBU 4.3 01/11/2021    CALCIUM 9.5 01/11/2021    BILITOT 0.3 01/11/2021    ALKPHOS 80 01/11/2021    AST 18 01/11/2021    ALT 15 01/11/2021     U/A:    Lab Results   Component Value Date    COLORU Yellow 01/11/2021    PROTEINU Negative 01/11/2021    PHUR 6.0 01/11/2021    WBCUA 2-5 01/11/2021    RBCUA 1-3 01/11/2021    BACTERIA RARE 01/11/2021    CLARITYU Clear 01/11/2021    SPECGRAV 1.025 01/11/2021    LEUKOCYTESUR SMALL 01/11/2021    UROBILINOGEN 0.2 01/11/2021    BILIRUBINUR Negative 01/11/2021    BLOODU TRACE-INTACT 01/11/2021    GLUCOSEU Negative 01/11/2021    AMORPHOUS FEW 10/28/2016     HgBA1c:    Lab Results   Component Value Date    LABA1C 5.3 01/11/2021     FLP:    Lab Results   Component Value Date    TRIG 101 01/11/2021    HDL 73 01/11/2021    LDLCALC 146 01/11/2021    LABVLDL 20 01/11/2021     TSH:    Lab Results   Component Value Date    TSH 3.480 01/11/2021        Assessment and Plan:  Timmy Gomez was seen today for discuss labs and fatigue. Diagnoses and all orders for this visit:    Other fatigue  -     Sedimentation Rate; Future  -     Rheumatoid Factor; Future  -     SUNDAR; Future  -     Cyclic Citrul Peptide Antibody, IgG; Future  -     C-Reactive Protein; Future  Patient describing numerous symptoms, but none that really lead to one diagnosis. Question if underlying rheumatologic condition causing all of her symptoms. Also explained that stress/anxiety can also cause her symptoms. Will check thrum labs first.     Polyarthralgia  -     Sedimentation Rate; Future  -     Rheumatoid Factor; Future  -     SUNDAR; Future  -     Cyclic Citrul Peptide Antibody, IgG; Future  -     C-Reactive Protein; Future    Thinning hair  OK to continue vitamins for now. Vitamin D insufficiency  Low at 23. Will add extra 2000U to the 2500U already in the vitamins she is taking. Return if symptoms worsen or fail to improve.       Seen By:  Rogerio Sheppard, DO

## 2021-01-20 DIAGNOSIS — M25.50 POLYARTHRALGIA: ICD-10-CM

## 2021-01-20 DIAGNOSIS — R53.83 OTHER FATIGUE: ICD-10-CM

## 2021-01-20 LAB
C-REACTIVE PROTEIN: 0.3 MG/DL (ref 0–0.4)
RHEUMATOID FACTOR: <10 IU/ML (ref 0–13)
SEDIMENTATION RATE, ERYTHROCYTE: 2 MM/HR (ref 0–20)

## 2021-01-21 LAB — ANTI-NUCLEAR ANTIBODY (ANA): NEGATIVE

## 2021-01-22 LAB — CCP IGG ANTIBODIES: 4 UNITS (ref 0–19)

## 2021-02-25 ENCOUNTER — HOSPITAL ENCOUNTER (OUTPATIENT)
Dept: MAMMOGRAPHY | Age: 66
Discharge: HOME | End: 2021-02-27
Payer: MEDICARE

## 2021-02-25 DIAGNOSIS — Z78.0 POSTMENOPAUSAL: ICD-10-CM

## 2021-02-25 PROCEDURE — 77080 DXA BONE DENSITY AXIAL: CPT

## 2021-05-03 ENCOUNTER — TELEPHONE (OUTPATIENT)
Dept: ADMINISTRATIVE | Age: 66
End: 2021-05-03

## 2021-05-03 NOTE — TELEPHONE ENCOUNTER
Pt c/o L lower side pain which is dull, comes and goes and has been experiencing it for about 4 days. Dr's schedule is full. Please call pt with recommendations/appts.

## 2021-06-03 ENCOUNTER — OFFICE VISIT (OUTPATIENT)
Dept: PRIMARY CARE CLINIC | Age: 66
End: 2021-06-03
Payer: MEDICARE

## 2021-06-03 VITALS
SYSTOLIC BLOOD PRESSURE: 124 MMHG | HEIGHT: 66 IN | OXYGEN SATURATION: 96 % | WEIGHT: 177 LBS | TEMPERATURE: 97 F | HEART RATE: 84 BPM | BODY MASS INDEX: 28.45 KG/M2 | DIASTOLIC BLOOD PRESSURE: 72 MMHG

## 2021-06-03 DIAGNOSIS — J06.9 VIRAL URI: ICD-10-CM

## 2021-06-03 DIAGNOSIS — N32.81 OAB (OVERACTIVE BLADDER): Primary | ICD-10-CM

## 2021-06-03 DIAGNOSIS — N89.8 VAGINAL DRYNESS: ICD-10-CM

## 2021-06-03 PROCEDURE — 3017F COLORECTAL CA SCREEN DOC REV: CPT | Performed by: FAMILY MEDICINE

## 2021-06-03 PROCEDURE — G8399 PT W/DXA RESULTS DOCUMENT: HCPCS | Performed by: FAMILY MEDICINE

## 2021-06-03 PROCEDURE — 1036F TOBACCO NON-USER: CPT | Performed by: FAMILY MEDICINE

## 2021-06-03 PROCEDURE — 1090F PRES/ABSN URINE INCON ASSESS: CPT | Performed by: FAMILY MEDICINE

## 2021-06-03 PROCEDURE — 1123F ACP DISCUSS/DSCN MKR DOCD: CPT | Performed by: FAMILY MEDICINE

## 2021-06-03 PROCEDURE — 99214 OFFICE O/P EST MOD 30 MIN: CPT | Performed by: FAMILY MEDICINE

## 2021-06-03 PROCEDURE — G8427 DOCREV CUR MEDS BY ELIG CLIN: HCPCS | Performed by: FAMILY MEDICINE

## 2021-06-03 PROCEDURE — G8417 CALC BMI ABV UP PARAM F/U: HCPCS | Performed by: FAMILY MEDICINE

## 2021-06-03 PROCEDURE — 4040F PNEUMOC VAC/ADMIN/RCVD: CPT | Performed by: FAMILY MEDICINE

## 2021-06-03 RX ORDER — CONJUGATED ESTROGENS 0.62 MG/G
0.5 CREAM VAGINAL DAILY
Qty: 1 TUBE | Refills: 3 | Status: SHIPPED
Start: 2021-06-03 | End: 2021-09-14

## 2021-06-03 RX ORDER — FAMOTIDINE 40 MG/1
40 TABLET, FILM COATED ORAL DAILY
COMMUNITY
End: 2021-10-13 | Stop reason: CLARIF

## 2021-06-03 RX ORDER — OXYBUTYNIN CHLORIDE 5 MG/1
5 TABLET, EXTENDED RELEASE ORAL DAILY
Qty: 30 TABLET | Refills: 3 | Status: SHIPPED
Start: 2021-06-03 | End: 2021-10-13 | Stop reason: CLARIF

## 2021-06-03 ASSESSMENT — ENCOUNTER SYMPTOMS
SINUS PRESSURE: 0
DIARRHEA: 0
BACK PAIN: 0
EYE DISCHARGE: 0
RHINORRHEA: 1
SINUS PAIN: 0
CONSTIPATION: 0
ABDOMINAL PAIN: 0
COUGH: 1
NAUSEA: 0
VOMITING: 0
WHEEZING: 0
SHORTNESS OF BREATH: 0
SORE THROAT: 1

## 2021-06-03 NOTE — PROGRESS NOTES
6/3/21  Thu Fonseca : 1955 Sex: female  Age: 77 y.o. Chief Complaint   Patient presents with    Urinary Frequency    Sinusitis     HPI:  77 y.o. female presents for acute visit due to several complaints. Urinary frequency  Patient notes that she has been having issues with urinary frequency. Finding that she is having to urinate every 30 minutes or so. Seen by Gyn and told that she does not have any organ prolapse that would suggest a cause for her symptoms. Did state that she might need to see Urology. Also having vaginal discharge. States that it is clear liquid that \"comes out\" at any time. Instructed to use unscented Dove soap, no underwear at bedtime. Improved and then started wearing underwear to bed again and is having issues. Upper Respiratory Symptoms  Patient complains of congestion, sore throat, nasal blockage, post nasal drip, dry cough and hoarseness. Patient denies anorexia, chest pain, chills, dizziness, fatigue, myalgias, nausea and shortness of breath. She has had symptoms for a few days. Symptoms have unchanged since that time. She denies a history of asthma. She has had recent close exposure to someone with similar symptoms. Past history is significant for no history of pneumonia or bronchitis. Patient is non-smoker      ROS:  Review of Systems   Constitutional: Negative for appetite change, chills, fatigue and fever. HENT: Positive for congestion, postnasal drip, rhinorrhea and sore throat. Negative for ear pain, sinus pressure and sinus pain. Eyes: Negative for discharge and visual disturbance. Respiratory: Positive for cough. Negative for shortness of breath and wheezing. Cardiovascular: Negative for chest pain, palpitations and leg swelling. Gastrointestinal: Negative for abdominal pain, constipation, diarrhea, nausea and vomiting. Genitourinary: Positive for frequency and vaginal discharge.  Negative for difficulty urinating, dysuria, flank pain, hematuria, menstrual problem, pelvic pain and urgency. Musculoskeletal: Negative for arthralgias and back pain. Skin: Negative for rash. Neurological: Negative for dizziness and headaches. Psychiatric/Behavioral: Negative for dysphoric mood. The patient is not nervous/anxious. All other systems reviewed and are negative. Current Outpatient Medications on File Prior to Visit   Medication Sig Dispense Refill    famotidine (PEPCID) 40 MG tablet Take 40 mg by mouth daily      cycloSPORINE (RESTASIS) 0.05 % ophthalmic emulsion 1 drop 2 times daily       No current facility-administered medications on file prior to visit. Allergies   Allergen Reactions    Psudatabs [Pseudoephedrine Hcl]      Racing heart    Shellfish-Derived Products      Scallops         History reviewed. No pertinent past medical history. Past Surgical History:   Procedure Laterality Date    APPENDECTOMY      ARM SURGERY Left     cyst    LIPOSUCTION Bilateral     Upper extremity     Family History   Problem Relation Age of Onset    Stroke Mother     Thyroid Disease Mother     Heart Failure Brother      Social History     Tobacco History     Smoking Status  Never Smoker    Smokeless Tobacco Use  Never Used                 Vitals:    06/03/21 1423   BP: 124/72   Pulse: 84   Temp: 97 °F (36.1 °C)   SpO2: 96%   Weight: 177 lb (80.3 kg)   Height: 5' 6\" (1.676 m)       Physical Exam:  Physical Exam  Vitals and nursing note reviewed. Constitutional:       General: She is not in acute distress. Appearance: Normal appearance. She is well-developed and normal weight. She is not ill-appearing. HENT:      Head: Normocephalic and atraumatic. Right Ear: Hearing, ear canal and external ear normal. A middle ear effusion (serous) is present. Tympanic membrane is bulging. Left Ear: Hearing, ear canal and external ear normal. A middle ear effusion (serous) is present.  Tympanic membrane is bulging. Nose: Congestion present. No mucosal edema or rhinorrhea. Right Sinus: No maxillary sinus tenderness or frontal sinus tenderness. Left Sinus: No maxillary sinus tenderness or frontal sinus tenderness. Comments: Wearing mask     Mouth/Throat:      Pharynx: Oropharyngeal exudate (PND) and posterior oropharyngeal erythema present. Eyes:      General: Lids are normal. No scleral icterus. Right eye: No discharge. Left eye: No discharge. Extraocular Movements: Extraocular movements intact. Conjunctiva/sclera: Conjunctivae normal.   Neck:      Thyroid: No thyromegaly. Cardiovascular:      Rate and Rhythm: Normal rate and regular rhythm. Heart sounds: Normal heart sounds. No murmur heard. Pulmonary:      Effort: Pulmonary effort is normal. No respiratory distress. Breath sounds: Normal breath sounds. No wheezing. Musculoskeletal:         General: No tenderness or deformity. Normal range of motion. Cervical back: Normal range of motion and neck supple. Right lower leg: No edema. Left lower leg: No edema. Lymphadenopathy:      Cervical: No cervical adenopathy. Skin:     General: Skin is warm and dry. Findings: No rash. Neurological:      General: No focal deficit present. Mental Status: She is alert and oriented to person, place, and time. Gait: Gait normal.   Psychiatric:         Mood and Affect: Mood and affect normal.         Speech: Speech normal.         Behavior: Behavior normal.         Thought Content: Thought content normal.          Assessment and Plan:  Yesica Lazo was seen today for urinary frequency and sinusitis. Diagnoses and all orders for this visit:    OAB (overactive bladder)  -     oxybutynin (DITROPAN XL) 5 MG extended release tablet; Take 1 tablet by mouth daily  Will try drying agent first.  If no improvement, likely needs antispasmodic.       Vaginal dryness  -     conjugated estrogens (PREMARIN) 0.625 MG/GM vaginal cream; Place 0.5 g vaginally daily  Gyn tried ordering before, but patient never picked up    Viral URI  Patient's symptoms consistent with a viral illness. She is in no acute distress and VSS. Recommended that she use Flonase/Nasacort, Claritin/Zyrtec. All conservative measures including fluid hydration and hand washing also discussed. Patient to call or return for repeat exam if symptoms are worsening. Return if symptoms worsen or fail to improve.       Seen By:  Rafaela Lorenzo, DO

## 2021-06-17 ENCOUNTER — OFFICE VISIT (OUTPATIENT)
Dept: FAMILY MEDICINE CLINIC | Age: 66
End: 2021-06-17
Payer: MEDICARE

## 2021-06-17 VITALS
HEART RATE: 63 BPM | SYSTOLIC BLOOD PRESSURE: 124 MMHG | DIASTOLIC BLOOD PRESSURE: 70 MMHG | RESPIRATION RATE: 18 BRPM | TEMPERATURE: 96.6 F | OXYGEN SATURATION: 97 % | BODY MASS INDEX: 28 KG/M2 | HEIGHT: 66 IN | WEIGHT: 174.2 LBS

## 2021-06-17 DIAGNOSIS — J01.10 ACUTE NON-RECURRENT FRONTAL SINUSITIS: Primary | ICD-10-CM

## 2021-06-17 PROCEDURE — 4040F PNEUMOC VAC/ADMIN/RCVD: CPT | Performed by: FAMILY MEDICINE

## 2021-06-17 PROCEDURE — 1090F PRES/ABSN URINE INCON ASSESS: CPT | Performed by: FAMILY MEDICINE

## 2021-06-17 PROCEDURE — G8399 PT W/DXA RESULTS DOCUMENT: HCPCS | Performed by: FAMILY MEDICINE

## 2021-06-17 PROCEDURE — G8417 CALC BMI ABV UP PARAM F/U: HCPCS | Performed by: FAMILY MEDICINE

## 2021-06-17 PROCEDURE — 99213 OFFICE O/P EST LOW 20 MIN: CPT | Performed by: FAMILY MEDICINE

## 2021-06-17 PROCEDURE — 1036F TOBACCO NON-USER: CPT | Performed by: FAMILY MEDICINE

## 2021-06-17 PROCEDURE — 3017F COLORECTAL CA SCREEN DOC REV: CPT | Performed by: FAMILY MEDICINE

## 2021-06-17 PROCEDURE — 1123F ACP DISCUSS/DSCN MKR DOCD: CPT | Performed by: FAMILY MEDICINE

## 2021-06-17 PROCEDURE — G8427 DOCREV CUR MEDS BY ELIG CLIN: HCPCS | Performed by: FAMILY MEDICINE

## 2021-06-17 RX ORDER — METHYLPREDNISOLONE 4 MG/1
TABLET ORAL
Qty: 1 KIT | Refills: 0 | Status: SHIPPED
Start: 2021-06-17 | End: 2021-08-09 | Stop reason: ALTCHOICE

## 2021-06-17 RX ORDER — CETIRIZINE HYDROCHLORIDE 10 MG/1
10 TABLET ORAL DAILY
Qty: 30 TABLET | Refills: 1 | Status: SHIPPED
Start: 2021-06-17 | End: 2021-09-14

## 2021-06-17 RX ORDER — AMOXICILLIN 250 MG/1
250 CAPSULE ORAL 3 TIMES DAILY
Qty: 30 CAPSULE | Refills: 0 | Status: SHIPPED | OUTPATIENT
Start: 2021-06-17 | End: 2021-06-27

## 2021-06-17 ASSESSMENT — ENCOUNTER SYMPTOMS
SINUS PAIN: 1
EYES NEGATIVE: 1
SORE THROAT: 0
RESPIRATORY NEGATIVE: 1
SINUS PRESSURE: 1
RHINORRHEA: 1

## 2021-06-17 NOTE — PROGRESS NOTES
21  Tiny Servant : 1955 Sex: female  Age: 77 y.o. Chief Complaint   Patient presents with    Sinus Problem    Drainage    Dizziness       The 78-year-old white female with a chief complaint of sinus congestion postnasal drainage dizziness no sore throat no fever chills cough shortness of breath no loss of taste or smell she has been fully immunized for Covid. Review of Systems   Constitutional: Negative. HENT: Positive for congestion, postnasal drip, rhinorrhea, sinus pressure and sinus pain. Negative for sneezing and sore throat. Eyes: Negative. Respiratory: Negative. Cardiovascular: Negative. Current Outpatient Medications:     cetirizine (ZYRTEC) 10 MG tablet, Take 1 tablet by mouth daily, Disp: 30 tablet, Rfl: 1    methylPREDNISolone (MEDROL DOSEPACK) 4 MG tablet, Take by mouth., Disp: 1 kit, Rfl: 0    amoxicillin (AMOXIL) 250 MG capsule, Take 1 capsule by mouth 3 times daily for 10 days, Disp: 30 capsule, Rfl: 0    famotidine (PEPCID) 40 MG tablet, Take 40 mg by mouth daily, Disp: , Rfl:     conjugated estrogens (PREMARIN) 0.625 MG/GM vaginal cream, Place 0.5 g vaginally daily, Disp: 1 Tube, Rfl: 3    oxybutynin (DITROPAN XL) 5 MG extended release tablet, Take 1 tablet by mouth daily, Disp: 30 tablet, Rfl: 3    cycloSPORINE (RESTASIS) 0.05 % ophthalmic emulsion, 1 drop 2 times daily, Disp: , Rfl:   Allergies   Allergen Reactions    Psudatabs [Pseudoephedrine Hcl]      Racing heart    Shellfish-Derived Products      Scallops         No past medical history on file.   Past Surgical History:   Procedure Laterality Date    APPENDECTOMY      ARM SURGERY Left     cyst    LIPOSUCTION Bilateral     Upper extremity     Family History   Problem Relation Age of Onset    Stroke Mother     Thyroid Disease Mother     Heart Failure Brother      Social History     Tobacco Use    Smoking status: Never Smoker    Smokeless tobacco: Never Used   Substance Use Topics    Alcohol use: Yes     Comment: occasional wine    Drug use: No        Vitals:    06/17/21 1205   BP: 124/70   Pulse: 63   Resp: 18   Temp: 96.6 °F (35.9 °C)   SpO2: 97%   Weight: 174 lb 3.2 oz (79 kg)   Height: 5' 6\" (1.676 m)       Physical Exam  Vitals and nursing note reviewed. Constitutional:       Appearance: Normal appearance. HENT:      Head: Normocephalic and atraumatic. Right Ear: Tympanic membrane, ear canal and external ear normal. No middle ear effusion. There is impacted cerumen. Left Ear: Tympanic membrane, ear canal and external ear normal.  No middle ear effusion. There is no impacted cerumen. Nose: Nasal tenderness, mucosal edema, congestion and rhinorrhea present. Right Turbinates: Enlarged and swollen. Left Turbinates: Enlarged and swollen. Right Sinus: Maxillary sinus tenderness and frontal sinus tenderness present. Left Sinus: Maxillary sinus tenderness and frontal sinus tenderness present. Mouth/Throat:      Mouth: Mucous membranes are moist.      Pharynx: Oropharynx is clear. No oropharyngeal exudate or posterior oropharyngeal erythema. Eyes:      Conjunctiva/sclera: Conjunctivae normal.   Neck:      Vascular: No carotid bruit. Cardiovascular:      Rate and Rhythm: Normal rate and regular rhythm. Heart sounds: Murmur heard. Pulmonary:      Effort: Pulmonary effort is normal.      Breath sounds: Normal breath sounds. Lymphadenopathy:      Cervical: No cervical adenopathy. Neurological:      Mental Status: She is alert. Assessment and Plan:  Ranjith Peralta was seen today for sinus problem, drainage and dizziness. Diagnoses and all orders for this visit:    Acute non-recurrent frontal sinusitis    Other orders  -     cetirizine (ZYRTEC) 10 MG tablet; Take 1 tablet by mouth daily  -     methylPREDNISolone (MEDROL DOSEPACK) 4 MG tablet; Take by mouth. -     amoxicillin (AMOXIL) 250 MG capsule;  Take 1 capsule by mouth 3 times daily for 10 days        Orders Placed This Encounter   Medications    cetirizine (ZYRTEC) 10 MG tablet     Sig: Take 1 tablet by mouth daily     Dispense:  30 tablet     Refill:  1    methylPREDNISolone (MEDROL DOSEPACK) 4 MG tablet     Sig: Take by mouth. Dispense:  1 kit     Refill:  0    amoxicillin (AMOXIL) 250 MG capsule     Sig: Take 1 capsule by mouth 3 times daily for 10 days     Dispense:  30 capsule     Refill:  0        Patient advised to follow up with PCP as needed. Seen By:  Arlet Clifton, DO  There is no evidence of any vestibular problems or dizziness with movement.   Strictly sinus from which we will treat follow-up with Dr. Lindy Schuster as needed

## 2021-08-04 ENCOUNTER — TELEPHONE (OUTPATIENT)
Dept: PRIMARY CARE CLINIC | Age: 66
End: 2021-08-04

## 2021-08-04 RX ORDER — PANTOPRAZOLE SODIUM 40 MG/1
40 TABLET, DELAYED RELEASE ORAL
Qty: 90 TABLET | Refills: 1 | Status: SHIPPED
Start: 2021-08-04 | End: 2021-09-14

## 2021-08-04 NOTE — TELEPHONE ENCOUNTER
----- Message from ARNALDO RICCI [facility] sent at 8/4/2021  8:22 AM EDT -----  Subject: Appointment Request    Reason for Call: Routine (Patient Request) No Script    QUESTIONS  Type of Appointment? Established Patient  Reason for appointment request? No appointments available during search  Additional Information for Provider? pt would like to get an appt asap for   her acid reflux medication that was given is not helping   ---------------------------------------------------------------------------  --------------  CALL BACK INFO  What is the best way for the office to contact you? OK to leave message on   voicemail  Preferred Call Back Phone Number? [phone]  ---------------------------------------------------------------------------  --------------  SCRIPT ANSWERS  Relationship to Patient? Self  (Is the patient requesting to see the provider for a procedure?)? No  (Is the patient requesting to see the provider urgently  today or   tomorrow. )? No  Have you been diagnosed with, awaiting test results for, or told that you   are suspected of having COVID-19 (Coronavirus)? (If patient has tested   negative or was tested as a requirement for work, school, or travel and   not based on symptoms, answer no)? No  Do you currently have flu-like symptoms including fever or chills, cough,   shortness of breath, difficulty breathing, or new loss of taste or smell? No  Have you had close contact with someone with COVID-19 in the last 14 days? No  (Service Expert  click yes below to proceed with [facility] As Usual   Scheduling)?  Yes

## 2021-08-04 NOTE — TELEPHONE ENCOUNTER
Please advise    pt would like to get an appt asap for her acid reflux medication that was given is not helping

## 2021-08-09 ENCOUNTER — OFFICE VISIT (OUTPATIENT)
Dept: FAMILY MEDICINE CLINIC | Age: 66
End: 2021-08-09
Payer: MEDICARE

## 2021-08-09 VITALS
RESPIRATION RATE: 18 BRPM | HEART RATE: 88 BPM | TEMPERATURE: 97.7 F | WEIGHT: 174 LBS | SYSTOLIC BLOOD PRESSURE: 132 MMHG | DIASTOLIC BLOOD PRESSURE: 80 MMHG | BODY MASS INDEX: 27.97 KG/M2 | OXYGEN SATURATION: 98 % | HEIGHT: 66 IN

## 2021-08-09 DIAGNOSIS — K80.20 CALCULUS OF GALLBLADDER WITHOUT CHOLECYSTITIS WITHOUT OBSTRUCTION: ICD-10-CM

## 2021-08-09 DIAGNOSIS — M54.6 ACUTE RIGHT-SIDED THORACIC BACK PAIN: ICD-10-CM

## 2021-08-09 DIAGNOSIS — R10.11 RIGHT UPPER QUADRANT PAIN: Primary | ICD-10-CM

## 2021-08-09 LAB
BILIRUBIN, POC: NEGATIVE
BLOOD URINE, POC: NORMAL
CLARITY, POC: CLEAR
COLOR, POC: YELLOW
GLUCOSE URINE, POC: NEGATIVE
KETONES, POC: NEGATIVE
LEUKOCYTE EST, POC: NORMAL
NITRITE, POC: NEGATIVE
PH, POC: 6
PROTEIN, POC: NEGATIVE
SPECIFIC GRAVITY, POC: 1.02
UROBILINOGEN, POC: 0.2

## 2021-08-09 PROCEDURE — G8427 DOCREV CUR MEDS BY ELIG CLIN: HCPCS | Performed by: PHYSICIAN ASSISTANT

## 2021-08-09 PROCEDURE — 4040F PNEUMOC VAC/ADMIN/RCVD: CPT | Performed by: PHYSICIAN ASSISTANT

## 2021-08-09 PROCEDURE — 81002 URINALYSIS NONAUTO W/O SCOPE: CPT | Performed by: PHYSICIAN ASSISTANT

## 2021-08-09 PROCEDURE — G8417 CALC BMI ABV UP PARAM F/U: HCPCS | Performed by: PHYSICIAN ASSISTANT

## 2021-08-09 PROCEDURE — 99214 OFFICE O/P EST MOD 30 MIN: CPT | Performed by: PHYSICIAN ASSISTANT

## 2021-08-09 PROCEDURE — G8399 PT W/DXA RESULTS DOCUMENT: HCPCS | Performed by: PHYSICIAN ASSISTANT

## 2021-08-09 PROCEDURE — 1090F PRES/ABSN URINE INCON ASSESS: CPT | Performed by: PHYSICIAN ASSISTANT

## 2021-08-09 PROCEDURE — 1036F TOBACCO NON-USER: CPT | Performed by: PHYSICIAN ASSISTANT

## 2021-08-09 PROCEDURE — 3006F CXR DOC REV: CPT | Performed by: PHYSICIAN ASSISTANT

## 2021-08-09 PROCEDURE — 1123F ACP DISCUSS/DSCN MKR DOCD: CPT | Performed by: PHYSICIAN ASSISTANT

## 2021-08-09 PROCEDURE — 3017F COLORECTAL CA SCREEN DOC REV: CPT | Performed by: PHYSICIAN ASSISTANT

## 2021-08-09 RX ORDER — SUCRALFATE ORAL 1 G/10ML
1 SUSPENSION ORAL 4 TIMES DAILY
Qty: 280 ML | Refills: 0 | Status: SHIPPED
Start: 2021-08-09 | End: 2021-08-24 | Stop reason: SDUPTHER

## 2021-08-09 RX ORDER — DICYCLOMINE HCL 20 MG
20 TABLET ORAL 4 TIMES DAILY PRN
Qty: 20 TABLET | Refills: 0 | Status: SHIPPED
Start: 2021-08-09 | End: 2021-09-14

## 2021-08-09 NOTE — PROGRESS NOTES
21  Ramandeep Vilchis : 1955 Sex: female  Age 77 y.o. Subjective:  Chief Complaint   Patient presents with    Back Pain     started thursday or friday          HPI:   Ramandeep Vilchis , 77 y.o. female presents to express care for evaluation of right-sided back pain and right upper quadrant pain. HPI   64-year-old female with a history of appendectomy, esophageal stricture, reflux presents to express care for evaluation of right back pain and right upper quadrant pain. The patient has had the symptoms ongoing for the last for 5 days. The patient states that it started last Thursday or Friday. Seem to get worse over the weekend. Thought it may be related to a kidney infection. Went to [facility] express [facility] and had a urinalysis that was essentially unremarkable. The patient is not having any fevers or chills. The patient is really not having burning with urination. Patient states that he tends to get bad at night. Seems to be worse with certain foods. The patient is not having any left-sided pain. The patient has had esophageal dilation done in the past from Dr. Quin Jasmine. The patient has been taking Protonix but she does not like the side effects of the medication and it does not seem to agree with her. ROS:   Unless otherwise stated in this report the patient's positive and negative responses for review of systems for constitutional, eyes, ENT, cardiovascular, respiratory, gastrointestinal, neurological, , musculoskeletal, and integument systems and related systems to the presenting problem are either stated in the history of present illness or were not pertinent or were negative for the symptoms and/or complaints related to the presenting medical problem. Positives and pertinent negatives as per HPI. All others reviewed and are negative. PMH:   History reviewed. No pertinent past medical history.     Past Surgical History:   Procedure Laterality Date    APPENDECTOMY      ARM SURGERY Left     cyst    LIPOSUCTION Bilateral     Upper extremity       Family History   Problem Relation Age of Onset    Stroke Mother     Thyroid Disease Mother     Heart Failure Brother        Medications:     Current Outpatient Medications:     dicyclomine (BENTYL) 20 MG tablet, Take 1 tablet by mouth 4 times daily as needed (pain), Disp: 20 tablet, Rfl: 0    sucralfate (CARAFATE) 1 GM/10ML suspension, Take 10 mLs by mouth 4 times daily for 7 days, Disp: 280 mL, Rfl: 0    pantoprazole (PROTONIX) 40 MG tablet, Take 1 tablet by mouth every morning (before breakfast), Disp: 90 tablet, Rfl: 1    cetirizine (ZYRTEC) 10 MG tablet, Take 1 tablet by mouth daily, Disp: 30 tablet, Rfl: 1    famotidine (PEPCID) 40 MG tablet, Take 40 mg by mouth daily, Disp: , Rfl:     conjugated estrogens (PREMARIN) 0.625 MG/GM vaginal cream, Place 0.5 g vaginally daily, Disp: 1 Tube, Rfl: 3    oxybutynin (DITROPAN XL) 5 MG extended release tablet, Take 1 tablet by mouth daily, Disp: 30 tablet, Rfl: 3    cycloSPORINE (RESTASIS) 0.05 % ophthalmic emulsion, 1 drop 2 times daily, Disp: , Rfl:     Allergies: Allergies   Allergen Reactions    Psudatabs [Pseudoephedrine Hcl]      Racing heart    Shellfish-Derived Products      Scallops         Social History:     Social History     Tobacco Use    Smoking status: Never Smoker    Smokeless tobacco: Never Used   Substance Use Topics    Alcohol use: Yes     Comment: occasional wine    Drug use: No       Patient lives at home. Physical Exam:     Vitals:    08/09/21 1056   BP: 132/80   Pulse: 88   Resp: 18   Temp: 97.7 °F (36.5 °C)   SpO2: 98%   Weight: 174 lb (78.9 kg)   Height: 5' 6\" (1.676 m)       Exam:  Physical Exam  Nurses note and vital signs reviewed and patient is not hypoxic. General: The patient appears well and in no apparent distress. Patient is resting comfortably on cart. Skin: Warm, dry, no pallor noted.  There is no rash noted.  Head: Normocephalic, atraumatic. Eye: Normal conjunctiva  Ears, Nose, Mouth, and Throat: Oral mucosa is moist  Cardiovascular: Regular Rate and Rhythm  Respiratory: Patient is in no distress, no accessory muscle use, lungs are clear to auscultation, no wheezing, crackles or rhonchi  Back: No significant midline tenderness of the thoracic or lumbar spine, no significant left CVA tenderness, mild right CVA tenderness  GI: Normal bowel sounds, minimal right upper quadrant tenderness to palpation, no masses appreciated. No rebound, guarding, or rigidity noted. Musculoskeletal: The patient has no evidence of calf tenderness, no pitting edema, symmetrical pulses noted bilaterally  Neurological: A&O x4, normal speech        Testing:     Results for orders placed or performed in visit on 08/09/21   POCT Urinalysis no Micro   Result Value Ref Range    Color, UA yellow     Clarity, UA clear     Glucose, UA POC negative     Bilirubin, UA negative     Ketones, UA negative     Spec Grav, UA 1.025     Blood, UA POC trace     pH, UA 6.0     Protein, UA POC negative     Urobilinogen, UA 0.2     Leukocytes, UA small     Nitrite, UA negative      XR CHEST STANDARD (2 VW)    Result Date: 8/9/2021  EXAMINATION: TWO XRAY VIEWS OF THE CHEST 8/9/2021 11:40 am COMPARISON: None. HISTORY: ORDERING SYSTEM PROVIDED HISTORY: Acute right-sided thoracic back pain TECHNOLOGIST PROVIDED HISTORY: Reason for exam:->right thoracic back pain FINDINGS: The lungs are without acute focal process. There is no effusion or pneumothorax. The cardiomediastinal silhouette is without acute process. The osseous structures are without acute process. No acute process.      US ABDOMEN LIMITED    Result Date: 8/9/2021  EXAMINATION: RIGHT UPPER QUADRANT ULTRASOUND 8/9/2021 11:17 am COMPARISON: AP CT 06/26/2012 HISTORY: ORDERING SYSTEM PROVIDED HISTORY: Right upper quadrant pain TECHNOLOGIST PROVIDED HISTORY: Dull constant right upper quadrant abdominal pain. Reason for exam:->RUQ pain Specify organ?->GALLBLADDER Specify organ?->LIVER FINDINGS: No abnormality in imaged portion of pancreas. No evidence of right upper quadrant ascites. Normal-appearing liver. No ultrasound evidence of right kidney abnormality. No evidence of abdominal aortic aneurysm. Atherosclerotic wall irregularity in the imaged portion of the abdominal aorta. Normal caliber imaged portion of IVC. Multiple shadowing echogenic foci in gallbladder lumen most compatible with gallstones. Calcified gallstones noted in 2012. Nonshadowing echogenic material may be sludge. Technologist reports a negative sonographic Milner sign. No evidence of pericholecystic fluid. Gallbladder wall minimally thickened up to 3.5 mm. This is nonspecific. Normal common bile duct with 2.4 mm diameter. Cholecystolithiasis again noted. Borderline gallbladder wall thickening may reflect mild cholecystitis although there is no ultrasound evidence of pericholecystic fluid and the sonographic Milner sign is negative. Medical Decision Making:     Vital signs reviewed    Past medical history reviewed. Allergies reviewed. Medications reviewed. Patient on arrival does not appear to be in any apparent distress or discomfort. The patient has been seen and evaluated. The patient does not appear to be toxic or lethargic. The patient had chest x-ray performed and a ultrasound of the right upper quadrant. Personally reviewed the chest x-ray did not see any evidence of acute cardiopulmonary process. Ultrasound showed Lucrecia cystolithiasis. Borderline gallbladder wall thickening may reflect mild cholecystitis. The patient does have sonographic Milner sign negative. Urinalysis was unremarkable no evidence of bilirubin or urobilinogen. The patient was sent for laboratory studies. We will treat the patient with Carafate and with Bentyl. The patient will follow-up with general surgery.   We did

## 2021-08-11 ENCOUNTER — OFFICE VISIT (OUTPATIENT)
Dept: SURGERY | Age: 66
End: 2021-08-11
Payer: MEDICARE

## 2021-08-11 ENCOUNTER — TELEPHONE (OUTPATIENT)
Dept: SURGERY | Age: 66
End: 2021-08-11

## 2021-08-11 VITALS
SYSTOLIC BLOOD PRESSURE: 132 MMHG | RESPIRATION RATE: 16 BRPM | OXYGEN SATURATION: 95 % | HEIGHT: 66 IN | HEART RATE: 65 BPM | DIASTOLIC BLOOD PRESSURE: 70 MMHG | WEIGHT: 173 LBS | TEMPERATURE: 98.5 F | BODY MASS INDEX: 27.8 KG/M2

## 2021-08-11 DIAGNOSIS — K58.2 IRRITABLE BOWEL SYNDROME WITH BOTH CONSTIPATION AND DIARRHEA: ICD-10-CM

## 2021-08-11 DIAGNOSIS — R10.9 RIGHT FLANK PAIN: Primary | ICD-10-CM

## 2021-08-11 PROCEDURE — 1036F TOBACCO NON-USER: CPT | Performed by: SURGERY

## 2021-08-11 PROCEDURE — G8417 CALC BMI ABV UP PARAM F/U: HCPCS | Performed by: SURGERY

## 2021-08-11 PROCEDURE — 1123F ACP DISCUSS/DSCN MKR DOCD: CPT | Performed by: SURGERY

## 2021-08-11 PROCEDURE — 4040F PNEUMOC VAC/ADMIN/RCVD: CPT | Performed by: SURGERY

## 2021-08-11 PROCEDURE — 1090F PRES/ABSN URINE INCON ASSESS: CPT | Performed by: SURGERY

## 2021-08-11 PROCEDURE — 3017F COLORECTAL CA SCREEN DOC REV: CPT | Performed by: SURGERY

## 2021-08-11 PROCEDURE — 99203 OFFICE O/P NEW LOW 30 MIN: CPT | Performed by: SURGERY

## 2021-08-11 PROCEDURE — G8427 DOCREV CUR MEDS BY ELIG CLIN: HCPCS | Performed by: SURGERY

## 2021-08-11 PROCEDURE — G8399 PT W/DXA RESULTS DOCUMENT: HCPCS | Performed by: SURGERY

## 2021-08-11 NOTE — PROGRESS NOTES
111 [facility] Surgery Clinic Note    Assessment/Plan:      Diagnosis Orders   1. Right flank pain with radiation to right abdomen  CT ABDOMEN PELVIS WO CONTRAST Additional Contrast? None    Discussed may or may not be gallbladder related. Will check a CT scan for other etiology   2. GERD; irritable bowel syndrome with both constipation and diarrhea      She reports intolerance to PPI but is currently on Protonix, Bentyl, and Carafate. Continue those. She follows with Dr. Ghazala Thomas, defer further to him         Return for Results. Chief Complaint   Patient presents with    New Patient     ref by Dinorah Guillory for cholecystitis. PCP: Ольга Guzmán DO    HPI: Jose Garvey is a 77 y.o. female who presents in consultation for right flank pain. Begins on her mid back on the right and radiates to her right upper quadrant. She says this is constant with a dull ache but sometimes will exacerbate. Not specifically related to food. It is sometimes very severe. She had normal LFTs recently. She complains of abdominal problems for \"months. \"  She also complains of acid reflux. She sees Dr. Ghazala Thomas. She has had EGD and dilation in the past.  She says she has stomach upset when she takes PPI but she is currently on Protonix, Carafate, and Bentyl. She also notes alternating diarrhea and constipation. Her LFTs recently were okay. She does complain of some chills occasionally. She had ultrasound showing gallstones. Her abdomen is benign to palpation today but she reports just a constant \"dull ache. \"      History reviewed. No pertinent past medical history. Past Surgical History:   Procedure Laterality Date    APPENDECTOMY      ARM SURGERY Left     cyst    LIPOSUCTION Bilateral     Upper extremity       Prior to Admission medications    Medication Sig Start Date End Date Taking?  Authorizing Provider   dicyclomine (BENTYL) 20 MG tablet Take 1 tablet by mouth 4 times daily as needed (pain) 8/9/21 8/14/21 Yes ALLYSON Michael III   sucralfate (CARAFATE) 1 GM/10ML suspension Take 10 mLs by mouth 4 times daily for 7 days 8/9/21 8/16/21 Yes ALLYSON Michael III   pantoprazole (PROTONIX) 40 MG tablet Take 1 tablet by mouth every morning (before breakfast) 8/4/21  Yes Manjula العلي,    cetirizine (ZYRTEC) 10 MG tablet Take 1 tablet by mouth daily 6/17/21  Yes Margo Espinoza, DO   famotidine (PEPCID) 40 MG tablet Take 40 mg by mouth daily   Yes Historical Provider, MD   conjugated estrogens (PREMARIN) 0.625 MG/GM vaginal cream Place 0.5 g vaginally daily 6/3/21  Yes Marleen Cunningham DO   oxybutynin (DITROPAN XL) 5 MG extended release tablet Take 1 tablet by mouth daily 6/3/21  Yes Marleen Cunningham DO   cycloSPORINE (RESTASIS) 0.05 % ophthalmic emulsion 1 drop 2 times daily   Yes Historical Provider, MD       Allergies   Allergen Reactions    Psudatabs [Pseudoephedrine Hcl]      Racing heart    Shellfish-Derived Products      Scallops         Social History     Socioeconomic History    Marital status:      Spouse name: None    Number of children: None    Years of education: None    Highest education level: None   Occupational History    None   Tobacco Use    Smoking status: Never Smoker    Smokeless tobacco: Never Used   Substance and Sexual Activity    Alcohol use: Yes     Comment: occasional wine    Drug use: No    Sexual activity: None   Other Topics Concern    None   Social History Narrative    None     Social Determinants of Health     Financial Resource Strain:     Difficulty of Paying Living Expenses:    Food Insecurity:     Worried About Running Out of Food in the Last Year:     Ran Out of Food in the Last Year:    Transportation Needs:     Lack of Transportation (Medical):      Lack of Transportation (Non-Medical):    Physical Activity:     Days of Exercise per Week:     Minutes of Exercise per Session:    Stress:     Feeling of Stress :    Social Connections:     Frequency of Communication with Friends and Family:     Frequency of Social Gatherings with Friends and Family:     Attends Congregation Services:     Active Member of Clubs or Organizations:     Attends Club or Organization Meetings:     Marital Status:    Intimate Partner Violence:     Fear of Current or Ex-Partner:     Emotionally Abused:     Physically Abused:     Sexually Abused:        Family History   Problem Relation Age of Onset    Stroke Mother     Thyroid Disease Mother     Heart Failure Brother        Review of Systems   All other systems reviewed and are negative. Objective:  Vitals:    08/11/21 0938   BP: 132/70   Pulse: 65   Resp: 16   Temp: 98.5 °F (36.9 °C)   TempSrc: Temporal   SpO2: 95%   Weight: 173 lb (78.5 kg)   Height: 5' 6\" (1.676 m)          Physical Exam  HENT:      Head: Normocephalic and atraumatic. Eyes:      General:         Right eye: No discharge. Left eye: No discharge. Neck:      Trachea: No tracheal deviation. Cardiovascular:      Rate and Rhythm: Normal rate. Pulmonary:      Effort: Pulmonary effort is normal. No respiratory distress. Abdominal:      General: There is no distension. Palpations: Abdomen is soft. Tenderness: There is no abdominal tenderness. There is no guarding or rebound. Skin:     General: Skin is warm and dry. Neurological:      Mental Status: She is alert and oriented to person, place, and time. Shruthi Fofana MD  8/11/2021    NOTE: This report, in part or full,may have been transcribed using voice recognition software. Every effort was made to ensure accuracy; however, inadvertent computerized transcription errors may be present. Please excuse any transcriptional grammatical or spelling errors that may have escaped my editorial review.     CC: Omer Manning DO, AH Davis III

## 2021-08-11 NOTE — TELEPHONE ENCOUNTER
Called and spoke with patient to return their message regarding their CT scan appointment. Pt is scheduled for a CT abdomen and pelvis w/o contrast on 09/01/2021 @ 4pm in [facility]. Gave patient directions for NPO 4 hours prior to surgery and to arrive at 3:45pm. Pt verbalized understanding.    Electronically signed by Shannan Garcia MA on 8/11/2021 at 4:06 PM

## 2021-08-18 ENCOUNTER — TELEPHONE (OUTPATIENT)
Dept: SURGERY | Age: 66
End: 2021-08-18

## 2021-08-18 NOTE — TELEPHONE ENCOUNTER
Patient called and wanted the order for CT scan abdomen pelvis faxed to [facility] because they could get her in sooner. MA faxed order to [phone].     Electronically signed by Amelia Martin MA on 8/18/2021 at 3:56 PM

## 2021-08-24 RX ORDER — SUCRALFATE ORAL 1 G/10ML
1 SUSPENSION ORAL 4 TIMES DAILY
Qty: 280 ML | Refills: 0 | Status: SHIPPED
Start: 2021-08-24 | End: 2021-09-14

## 2021-08-26 ENCOUNTER — OFFICE VISIT (OUTPATIENT)
Dept: SURGERY | Age: 66
End: 2021-08-26
Payer: MEDICARE

## 2021-08-26 VITALS
WEIGHT: 173 LBS | OXYGEN SATURATION: 96 % | BODY MASS INDEX: 27.8 KG/M2 | HEART RATE: 75 BPM | TEMPERATURE: 97.7 F | HEIGHT: 66 IN | SYSTOLIC BLOOD PRESSURE: 164 MMHG | RESPIRATION RATE: 16 BRPM | DIASTOLIC BLOOD PRESSURE: 103 MMHG

## 2021-08-26 DIAGNOSIS — R10.9 RIGHT FLANK PAIN: Primary | ICD-10-CM

## 2021-08-26 PROCEDURE — G8417 CALC BMI ABV UP PARAM F/U: HCPCS | Performed by: SURGERY

## 2021-08-26 PROCEDURE — 4040F PNEUMOC VAC/ADMIN/RCVD: CPT | Performed by: SURGERY

## 2021-08-26 PROCEDURE — 99213 OFFICE O/P EST LOW 20 MIN: CPT | Performed by: SURGERY

## 2021-08-26 PROCEDURE — 3017F COLORECTAL CA SCREEN DOC REV: CPT | Performed by: SURGERY

## 2021-08-26 PROCEDURE — 1123F ACP DISCUSS/DSCN MKR DOCD: CPT | Performed by: SURGERY

## 2021-08-26 PROCEDURE — G8427 DOCREV CUR MEDS BY ELIG CLIN: HCPCS | Performed by: SURGERY

## 2021-08-26 PROCEDURE — 1090F PRES/ABSN URINE INCON ASSESS: CPT | Performed by: SURGERY

## 2021-08-26 PROCEDURE — G8399 PT W/DXA RESULTS DOCUMENT: HCPCS | Performed by: SURGERY

## 2021-08-26 PROCEDURE — 1036F TOBACCO NON-USER: CPT | Performed by: SURGERY

## 2021-08-27 ENCOUNTER — TELEPHONE (OUTPATIENT)
Dept: PRIMARY CARE CLINIC | Age: 66
End: 2021-08-27

## 2021-08-27 NOTE — TELEPHONE ENCOUNTER
The pt came through the walk in 8/9 for right-sided back pain and right upper quadrant pain.  ordered an [address] [place] [address] [place] results are  Cholecystolithiasis again noted.  Borderline gallbladder wall thickening may   reflect mild cholecystitis although there is no ultrasound evidence of   pericholecystic fluid and the sonographic Milner sign is negative.       and gave her a referral to see Dr. Laurita Treviño. The pt saw Dr. Laurita Treviño 8/11 and he ordered her a CT [address] [facility], [place], operated by [facility] and it resulted as Cholelithiasis and possible gallbladder wall calcification is noted. Dr. Laurita Treviño is recommending she get her gallbladder removed but she is confused on why if it is her gallbladder is her back bothering her.  She is asking if before she decides to do the surgery if there are any further tests that can be done on her back to see what is going on

## 2021-08-27 NOTE — TELEPHONE ENCOUNTER
Attempted to call patient to discuss. No answer. LM to return call. When the GB is enlarged or inflammed, there can be radiation of the pain into the back. It looks like work up of other causes of back pain were all negative.

## 2021-08-30 NOTE — PROGRESS NOTES
111 [facility] Surgery Clinic Note    Assessment/Plan:     Diagnosis Orders   1. Right flank pain with radiation to right abdomen      CT otherwise unremarkable. Possible could be related to her gallbladder although cannot guarantee cholecystectomy would relieve her symptom. Wishes to proceed       Return for Surgery. Chief Complaint   Patient presents with    Results     CT follow up. PCP: Marleen Cunningham DO    HPI: Yue iHlliard is a 74 y.o. female here for CT follow-up of right flank pain. CT was unrevealing aside from cholelithiasis and questionable gallbladder wall calcification versus gallstones. Pain radiates to her right upper quadrant occasionally. There is no significant food relation. She again says that the pain is a constant dull ache. There have been no change in her symptoms ongoing for the last several months. We discussed we can proceed with cholecystectomy although cannot guarantee her symptoms would resolve but it is possible. Review of Systems   All other systems reviewed and are negative. The remainder of the past medical, past surgical, family, and psychosocial history, as well as medication and allergy review, were completed and are as documented elsewhere in the chart. Objective:  Vitals:    08/26/21 1558   BP: (!) 164/103   Pulse: 75   Resp: 16   Temp: 97.7 °F (36.5 °C)   TempSrc: Temporal   SpO2: 96%   Weight: 173 lb (78.5 kg)   Height: 5' 6\" (1.676 m)          Physical Exam  HENT:      Head: Normocephalic and atraumatic. Eyes:      General:         Right eye: No discharge. Left eye: No discharge. Neck:      Trachea: No tracheal deviation. Cardiovascular:      Rate and Rhythm: Normal rate. Pulmonary:      Effort: Pulmonary effort is normal. No respiratory distress. Abdominal:      General: There is no distension. Palpations: Abdomen is soft. Tenderness: There is no abdominal tenderness. There is no guarding or rebound. Skin:     General: Skin is warm and dry. Neurological:      Mental Status: She is alert and oriented to person, place, and time. Kasandra Orourke MD      NOTE: This report, in part or full, may have been transcribed using voice recognition software. Every effort was made to ensure accuracy; however, inadvertent computerized transcription errors may be present. Please excuse any transcriptional grammatical or spelling errors that may have escaped my editorial review.       CC: Marleen Cunningham, DO

## 2021-09-01 ENCOUNTER — TELEPHONE (OUTPATIENT)
Dept: SURGERY | Age: 66
End: 2021-09-01

## 2021-09-01 NOTE — TELEPHONE ENCOUNTER
Sola Canada is scheduled for laparoscopic robotic XI assisted cholecystectomy with Dr Baldo Puente on 11-08-21 at [facility] at 11:00 am. Patient was told to arrive at   9:00 am. Patient needs to be NPO after midnight the night before procedure. All surgery instructions were explained to the patient and a surgery letter was also mailed out. MA informed patient that PAT will also be calling to review pre-op instructions and medications. Patient verbalized understanding.   Electronically signed by Nilson Garrett MA on 9/1/2021 at 3:07 PM

## 2021-09-01 NOTE — TELEPHONE ENCOUNTER
Prior Authorization Form:      DEMOGRAPHICS:                     Patient Name:  Artemio Amador  Patient :  1955            Insurance:  Payor: MEDICARE / Plan: MEDICARE PART A AND B / Product Type: *No Product type* /   Insurance ID Number:    Payor/Plan Subscr  Sex Relation Sub. Ins. ID Effective Group Num   1. MEDICARE - ME* JAKE BENTLEY* 1955 Female Self [id number]                                    [address] [address]   2.  [address]* 1955 Female Self [id number]                                    [address] [id number]         DIAGNOSIS & PROCEDURE:                       Procedure/Operation: Laparoscopic robotic XI assisted cholecystectomy           CPT Code: 54820    Diagnosis:  Cholelithiasis    ICD10 Code: K80.20    Location:  [facility]    Surgeon:  Dr Marita Blum INFORMATION:                          Date: 21    Time: 11:00 am              Anesthesia:  General                                                       Status:  Outpatient        Special Comments:         Electronically signed by Lilia Duverney, MA on 2021 at 3:09 PM

## 2021-09-07 ENCOUNTER — HOSPITAL ENCOUNTER (OUTPATIENT)
Age: 66
Discharge: HOME | End: 2021-09-09

## 2021-09-07 PROCEDURE — 88305 TISSUE EXAM BY PATHOLOGIST: CPT

## 2021-09-08 ENCOUNTER — NURSE TRIAGE (OUTPATIENT)
Dept: OTHER | Facility: CLINIC | Age: 66
End: 2021-09-08

## 2021-09-08 ENCOUNTER — HOSPITAL ENCOUNTER (EMERGENCY)
Age: 66
Discharge: HOME | End: 2021-09-09
Attending: EMERGENCY MEDICINE
Payer: MEDICARE

## 2021-09-08 DIAGNOSIS — I10 HYPERTENSION, UNSPECIFIED TYPE: Primary | ICD-10-CM

## 2021-09-08 LAB
BASOPHILS ABSOLUTE: 0.12 E9/L (ref 0–0.2)
BASOPHILS RELATIVE PERCENT: 1 % (ref 0–2)
EOSINOPHILS ABSOLUTE: 0.29 E9/L (ref 0.05–0.5)
EOSINOPHILS RELATIVE PERCENT: 2.4 % (ref 0–6)
HCT VFR BLD CALC: 46.8 % (ref 34–48)
HEMOGLOBIN: 15.2 G/DL (ref 11.5–15.5)
IMMATURE GRANULOCYTES #: 0.03 E9/L
IMMATURE GRANULOCYTES %: 0.3 % (ref 0–5)
LYMPHOCYTES ABSOLUTE: 4.14 E9/L (ref 1.5–4)
LYMPHOCYTES RELATIVE PERCENT: 34.7 % (ref 20–42)
MCH RBC QN AUTO: 30.2 PG (ref 26–35)
MCHC RBC AUTO-ENTMCNC: 32.5 % (ref 32–34.5)
MCV RBC AUTO: 93 FL (ref 80–99.9)
MONOCYTES ABSOLUTE: 1.34 E9/L (ref 0.1–0.95)
MONOCYTES RELATIVE PERCENT: 11.2 % (ref 2–12)
NEUTROPHILS ABSOLUTE: 6.02 E9/L (ref 1.8–7.3)
NEUTROPHILS RELATIVE PERCENT: 50.4 % (ref 43–80)
PDW BLD-RTO: 13.2 FL (ref 11.5–15)
PLATELET # BLD: 323 E9/L (ref 130–450)
PMV BLD AUTO: 9.6 FL (ref 7–12)
RBC # BLD: 5.03 E12/L (ref 3.5–5.5)
WBC # BLD: 11.9 E9/L (ref 4.5–11.5)

## 2021-09-08 PROCEDURE — 84484 ASSAY OF TROPONIN QUANT: CPT

## 2021-09-08 PROCEDURE — 80053 COMPREHEN METABOLIC PANEL: CPT

## 2021-09-08 PROCEDURE — 85025 COMPLETE CBC W/AUTO DIFF WBC: CPT

## 2021-09-08 PROCEDURE — 99284 EMERGENCY DEPT VISIT MOD MDM: CPT

## 2021-09-08 PROCEDURE — 6370000000 HC RX 637 (ALT 250 FOR IP): Performed by: EMERGENCY MEDICINE

## 2021-09-08 RX ORDER — AMLODIPINE BESYLATE 5 MG/1
10 TABLET ORAL ONCE
Status: COMPLETED | OUTPATIENT
Start: 2021-09-08 | End: 2021-09-08

## 2021-09-08 RX ADMIN — AMLODIPINE BESYLATE 10 MG: 5 TABLET ORAL at 23:49

## 2021-09-08 NOTE — TELEPHONE ENCOUNTER
Reason for Disposition   [5] Systolic BP  >= 328 OR Diastolic >= 397 AND [9] cardiac or neurologic symptoms (e.g., chest pain, difficulty breathing, unsteady gait, blurred vision)    Answer Assessment - Initial Assessment Questions  1. BLOOD PRESSURE: \"What is the blood pressure? \" \"Did you take at least two measurements 5 minutes apart? \"      This am: 166/99  and @ 194/113 @ 16:30    2. ONSET: \"When did you take your blood pressure? \"      Pt reports some meetings and medical procedures starting 9/2/21 and the SBP was 164 at that time    3. HOW: \"How did you obtain the blood pressure? \" (e.g., visiting nurse, automatic home BP monitor)      Home BP monitor    4. HISTORY: \"Do you have a history of high blood pressure? \"      No HX of HTN    5. MEDICATIONS: Catheline Aus you taking any medications for blood pressure? \" \"Have you missed any doses recently?\"        6. OTHER SYMPTOMS: \"Do you have any symptoms? \" (e.g., headache, chest pain, blurred vision, difficulty breathing, weakness)      None    7. PREGNANCY: \"Is there any chance you are pregnant? \" \"When was your last menstrual period? \"     n/a    Protocols used: HIGH BLOOD PRESSURE-ADULT-AH    Received call from [facility] at [facility] with [employer] Complaint. Brief description of triage: pt reports HTN; BP @ 1630 = 194/113. Has been elevated for one week; pt had a surgical procedure on 9/7/21    Triage indicates for patient to go to [facility]/ED. Pt declined initially d/t COVID concerns in facilities. RN made contact with after-hours line and left message for Provider to call pt at pt's phone number. When checking back w/pt, she did take info for [facility] and expressed understanding that she may need to go to have HTN treated. Care advice provided, patient verbalizes understanding; denies any other questions or concerns; instructed to call back for any new or worsening symptoms. Attention Provider:   Thank you for allowing me to participate in the care of your patient. The patient was connected to triage in response to information provided to the ECC. Please do not respond through this encounter as the response is not directed to a shared pool.

## 2021-09-09 ENCOUNTER — TELEPHONE (OUTPATIENT)
Dept: PRIMARY CARE CLINIC | Age: 66
End: 2021-09-09

## 2021-09-09 VITALS
HEIGHT: 66 IN | HEART RATE: 60 BPM | DIASTOLIC BLOOD PRESSURE: 63 MMHG | WEIGHT: 169 LBS | SYSTOLIC BLOOD PRESSURE: 118 MMHG | RESPIRATION RATE: 16 BRPM | BODY MASS INDEX: 27.16 KG/M2 | OXYGEN SATURATION: 98 % | TEMPERATURE: 98.3 F

## 2021-09-09 LAB
ALBUMIN SERPL-MCNC: 4.5 G/DL (ref 3.5–5.2)
ALP BLD-CCNC: 81 U/L (ref 35–104)
ALT SERPL-CCNC: 18 U/L (ref 0–32)
ANION GAP SERPL CALCULATED.3IONS-SCNC: 7 MMOL/L (ref 7–16)
AST SERPL-CCNC: 23 U/L (ref 0–31)
BILIRUB SERPL-MCNC: 0.4 MG/DL (ref 0–1.2)
BUN BLDV-MCNC: 10 MG/DL (ref 6–23)
CALCIUM SERPL-MCNC: 9.8 MG/DL (ref 8.6–10.2)
CHLORIDE BLD-SCNC: 106 MMOL/L (ref 98–107)
CO2: 28 MMOL/L (ref 22–29)
CREAT SERPL-MCNC: 0.8 MG/DL (ref 0.5–1)
EKG ATRIAL RATE: 56 BPM
EKG P AXIS: 57 DEGREES
EKG P-R INTERVAL: 164 MS
EKG Q-T INTERVAL: 432 MS
EKG QRS DURATION: 108 MS
EKG QTC CALCULATION (BAZETT): 416 MS
EKG R AXIS: 37 DEGREES
EKG T AXIS: 52 DEGREES
EKG VENTRICULAR RATE: 56 BPM
GFR AFRICAN AMERICAN: >60
GFR NON-AFRICAN AMERICAN: >60 ML/MIN/1.73
GLUCOSE BLD-MCNC: 93 MG/DL (ref 74–99)
POTASSIUM SERPL-SCNC: 4.4 MMOL/L (ref 3.5–5)
SODIUM BLD-SCNC: 141 MMOL/L (ref 132–146)
TOTAL PROTEIN: 7.9 G/DL (ref 6.4–8.3)
TROPONIN, HIGH SENSITIVITY: 11 NG/L (ref 0–9)

## 2021-09-09 PROCEDURE — 6370000000 HC RX 637 (ALT 250 FOR IP): Performed by: EMERGENCY MEDICINE

## 2021-09-09 PROCEDURE — 93005 ELECTROCARDIOGRAM TRACING: CPT | Performed by: PHYSICIAN ASSISTANT

## 2021-09-09 PROCEDURE — 93010 ELECTROCARDIOGRAM REPORT: CPT | Performed by: INTERNAL MEDICINE

## 2021-09-09 RX ORDER — CLONIDINE HYDROCHLORIDE 0.1 MG/1
0.1 TABLET ORAL 2 TIMES DAILY PRN
Qty: 20 TABLET | Refills: 3 | Status: SHIPPED | OUTPATIENT
Start: 2021-09-09 | End: 2021-10-13 | Stop reason: CLARIF

## 2021-09-09 RX ORDER — AMLODIPINE BESYLATE 10 MG/1
10 TABLET ORAL DAILY
Qty: 30 TABLET | Refills: 1 | Status: SHIPPED | OUTPATIENT
Start: 2021-09-09 | End: 2021-09-14 | Stop reason: SDUPTHER

## 2021-09-09 RX ORDER — CLONIDINE HYDROCHLORIDE 0.1 MG/1
0.1 TABLET ORAL ONCE
Status: COMPLETED | OUTPATIENT
Start: 2021-09-09 | End: 2021-09-09

## 2021-09-09 RX ADMIN — CLONIDINE HYDROCHLORIDE 0.1 MG: 0.1 TABLET ORAL at 00:46

## 2021-09-09 RX ADMIN — CLONIDINE HYDROCHLORIDE 0.1 MG: 0.1 TABLET ORAL at 01:38

## 2021-09-09 NOTE — TELEPHONE ENCOUNTER
She was already seen in the ER for this. She needs to follow up on the new meds they put her on. Can she come on the 14th at 415 or double book at 46 with the VV?

## 2021-09-09 NOTE — ED NOTES
FIRST PROVIDER CONTACT ASSESSMENT NOTE                                                                                                Department of Emergency Medicine                                                      First Provider Note  21  8:10 PM EDT  NAME: Meagan Bullock  : 1955  MRN: [id number]    Chief Complaint: Hypertension (Pt stated for the last two days. Called PCP but did not return the call)      History of Present Illness:   Meagan Bullock is a 74 y.o. female who presents to the ED for  htn no headache     Focused Physical Exam:  VS:    ED Triage Vitals   BP Temp Temp src Pulse Resp SpO2 Height Weight   -- -- -- -- -- -- -- --        General: Alert and in no apparent distress. Heart regular rate and rhythm  Lungs clear    Medical History:  has no past medical history on file. Surgical History:  has a past surgical history that includes Appendectomy; Arm Surgery (Left); and Liposuction (Bilateral). Social History:  reports that she has never smoked. She has never used smokeless tobacco. She reports current alcohol use. She reports that she does not use drugs. Family History: family history includes Heart Failure in her brother; Stroke in her mother; Thyroid Disease in her mother.     Allergies: Psudatabs [pseudoephedrine hcl] and Shellfish-derived products     Initial Plan of Care:  Initiate Treatment-Testing, Proceed toTreatment Area When Bed Available for ED Attending/MLP to Continue Care    -------------------------------------------------[address] ASSESSMENT NOTE--------------------------------------------------------  Electronically signed by ALLYSON Jacobs   DD: 21     ALLYSON Jacobs  21

## 2021-09-09 NOTE — ED PROVIDER NOTES
HPI:  9/9/21,   Time: 12:35 AM EDT         Conor Connor is a 74 y.o. female presenting to the ED for high blood pressure with a systolic around 229 last checked at home patient is asymptomatic and first noted to have high blood pressure last Thursday when she was getting preoperative testing for a D&C was done a day and a half ago, beginning 6 days ago. Patient denies chest pain or shortness of breath or headache or blurred vision. No numbness weakness or tingling of the arms or legs  ROS:   Pertinent positives and negatives are stated within HPI, all other systems reviewed and are negative.  --------------------------------------------- PAST HISTORY ---------------------------------------------  Past Medical History:  has no past medical history on file. Past Surgical History:  has a past surgical history that includes Appendectomy; Arm Surgery (Left); and Liposuction (Bilateral). Social History:  reports that she has never smoked. She has never used smokeless tobacco. She reports current alcohol use. She reports that she does not use drugs. Family History: family history includes Heart Failure in her brother; Stroke in her mother; Thyroid Disease in her mother. The patients home medications have been reviewed.     Allergies: Psudatabs [pseudoephedrine hcl] and Shellfish-derived products    -------------------------------------------------- RESULTS -------------------------------------------------  All laboratory and radiology results have been personally reviewed by myself   LABS:  Results for orders placed or performed during the hospital encounter of 09/08/21   CBC Auto Differential   Result Value Ref Range    WBC 11.9 (H) 4.5 - 11.5 E9/L    RBC 5.03 3.50 - 5.50 E12/L    Hemoglobin 15.2 11.5 - 15.5 g/dL    Hematocrit 46.8 34.0 - 48.0 %    MCV 93.0 80.0 - 99.9 fL    MCH 30.2 26.0 - 35.0 pg    MCHC 32.5 32.0 - 34.5 %    RDW 13.2 11.5 - 15.0 fL    Platelets 183 450 - 555 E9/L    MPV 9.6 7.0 - 12.0 fL    Neutrophils % 50.4 43.0 - 80.0 %    Immature Granulocytes % 0.3 0.0 - 5.0 %    Lymphocytes % 34.7 20.0 - 42.0 %    Monocytes % 11.2 2.0 - 12.0 %    Eosinophils % 2.4 0.0 - 6.0 %    Basophils % 1.0 0.0 - 2.0 %    Neutrophils Absolute 6.02 1.80 - 7.30 E9/L    Immature Granulocytes # 0.03 E9/L    Lymphocytes Absolute 4.14 (H) 1.50 - 4.00 E9/L    Monocytes Absolute 1.34 (H) 0.10 - 0.95 E9/L    Eosinophils Absolute 0.29 0.05 - 0.50 E9/L    Basophils Absolute 0.12 0.00 - 0.20 E9/L   Comprehensive Metabolic Panel   Result Value Ref Range    Sodium 141 132 - 146 mmol/L    Potassium 4.4 3.5 - 5.0 mmol/L    Chloride 106 98 - 107 mmol/L    CO2 28 22 - 29 mmol/L    Anion Gap 7 7 - 16 mmol/L    Glucose 93 74 - 99 mg/dL    BUN 10 6 - 23 mg/dL    CREATININE 0.8 0.5 - 1.0 mg/dL    GFR Non-African American >60 >=60 mL/min/1.73    GFR African American >60     Calcium 9.8 8.6 - 10.2 mg/dL    Total Protein 7.9 6.4 - 8.3 g/dL    Albumin 4.5 3.5 - 5.2 g/dL    Total Bilirubin 0.4 0.0 - 1.2 mg/dL    Alkaline Phosphatase 81 35 - 104 U/L    ALT 18 0 - 32 U/L    AST 23 0 - 31 U/L   Troponin   Result Value Ref Range    Troponin, High Sensitivity 11 (H) 0 - 9 ng/L   EKG 12 Lead   Result Value Ref Range    Ventricular Rate 56 BPM    Atrial Rate 56 BPM    P-R Interval 164 ms    QRS Duration 108 ms    Q-T Interval 432 ms    QTc Calculation (Bazett) 416 ms    P Axis 57 degrees    R Axis 37 degrees    T Axis 52 degrees       RADIOLOGY:  Interpreted by Radiologist.  No orders to display       ------------------------- NURSING NOTES AND VITALS REVIEWED ---------------------------   The nursing notes within the ED encounter and vital signs as below have been reviewed.    BP (!) 201/107   Pulse 56   Temp 98.3 °F (36.8 °C) (Oral)   Resp 16   Ht 5' 6\" (1.676 m)   Wt 169 lb (76.7 kg)   SpO2 97%   BMI 27.28 kg/m²   Oxygen Saturation Interpretation:       ---------------------------------------------------PHYSICAL

## 2021-09-09 NOTE — TELEPHONE ENCOUNTER
----- Message from Prosper Tapia sent at 9/8/2021 10:21 AM EDT -----  Subject: Message to Provider    QUESTIONS  Information for Provider? PATIENT SAID SHE WOULD LIKE TO BE ADVISED ABOUT   HER BLOOD PRESSURE BECAUSE IT'S BEEN HIGH.  ---------------------------------------------------------------------------  --------------  CALL BACK INFO  What is the best way for the office to contact you? OK to leave message on   voicemail  Preferred Call Back Phone Number? [phone]  ---------------------------------------------------------------------------  --------------  SCRIPT ANSWERS  Relationship to Patient?  Self

## 2021-09-09 NOTE — ED NOTES
Pt given d/c instructions. Pt to f.u with pcp in 3 days. Pt given instructions on when to return to ED. Pt verbalized understanding of instructions. Pt left in stable and ambulatory condition. Pt IV d/c without any complications.      Jennifer Quiroz RN  09/09/21 9415

## 2021-09-10 ENCOUNTER — TELEPHONE (OUTPATIENT)
Dept: PRIMARY CARE CLINIC | Age: 66
End: 2021-09-10

## 2021-09-10 NOTE — TELEPHONE ENCOUNTER
----- Message from Sujatha Mchugh sent at 9/9/2021 11:37 AM EDT -----  Subject: Medication Problem    QUESTIONS  Name of Medication? amLODIPine (NORVASC) 10 MG tablet  Patient-reported dosage and instructions? unknown  What question or problem do you have with the medication? patient would   like to know when she should start this medication. Should she start   immediately or wait until tomorrow? Please call her back to advise. Preferred Pharmacy? [facility], [address] [facility]  Pharmacy phone number (if available)? [phone]  Additional Information for Provider?   ---------------------------------------------------------------------------  --------------  [address]  What is the best way for the office to contact you? OK to leave message on   voicemail  Preferred Call Back Phone Number? [phone]  ---------------------------------------------------------------------------  --------------  SCRIPT ANSWERS  Relationship to Patient?  Self

## 2021-09-13 ENCOUNTER — TELEPHONE (OUTPATIENT)
Dept: PRIMARY CARE CLINIC | Age: 66
End: 2021-09-13

## 2021-09-13 NOTE — TELEPHONE ENCOUNTER
Patient having a hard time with elevated bp she is wanting to know at what point does she need to be taking the clonidine?   \"She does not feel secure on her feet\"

## 2021-09-14 ENCOUNTER — OFFICE VISIT (OUTPATIENT)
Dept: PRIMARY CARE CLINIC | Age: 66
End: 2021-09-14
Payer: MEDICARE

## 2021-09-14 VITALS
BODY MASS INDEX: 27.64 KG/M2 | WEIGHT: 172 LBS | TEMPERATURE: 97.8 F | SYSTOLIC BLOOD PRESSURE: 130 MMHG | OXYGEN SATURATION: 95 % | HEIGHT: 66 IN | DIASTOLIC BLOOD PRESSURE: 86 MMHG | HEART RATE: 88 BPM

## 2021-09-14 DIAGNOSIS — I10 ESSENTIAL HYPERTENSION: Primary | ICD-10-CM

## 2021-09-14 DIAGNOSIS — R94.31 ABNORMAL EKG: ICD-10-CM

## 2021-09-14 DIAGNOSIS — I45.10 INCOMPLETE RBBB: ICD-10-CM

## 2021-09-14 DIAGNOSIS — R53.83 OTHER FATIGUE: ICD-10-CM

## 2021-09-14 DIAGNOSIS — R77.8 ELEVATED TROPONIN: ICD-10-CM

## 2021-09-14 PROCEDURE — G8417 CALC BMI ABV UP PARAM F/U: HCPCS | Performed by: FAMILY MEDICINE

## 2021-09-14 PROCEDURE — G8427 DOCREV CUR MEDS BY ELIG CLIN: HCPCS | Performed by: FAMILY MEDICINE

## 2021-09-14 PROCEDURE — 1090F PRES/ABSN URINE INCON ASSESS: CPT | Performed by: FAMILY MEDICINE

## 2021-09-14 PROCEDURE — 99214 OFFICE O/P EST MOD 30 MIN: CPT | Performed by: FAMILY MEDICINE

## 2021-09-14 PROCEDURE — 1123F ACP DISCUSS/DSCN MKR DOCD: CPT | Performed by: FAMILY MEDICINE

## 2021-09-14 PROCEDURE — 4040F PNEUMOC VAC/ADMIN/RCVD: CPT | Performed by: FAMILY MEDICINE

## 2021-09-14 PROCEDURE — 1036F TOBACCO NON-USER: CPT | Performed by: FAMILY MEDICINE

## 2021-09-14 PROCEDURE — 3017F COLORECTAL CA SCREEN DOC REV: CPT | Performed by: FAMILY MEDICINE

## 2021-09-14 PROCEDURE — G8399 PT W/DXA RESULTS DOCUMENT: HCPCS | Performed by: FAMILY MEDICINE

## 2021-09-14 RX ORDER — SUCRALFATE ORAL 1 G/10ML
1 SUSPENSION ORAL 4 TIMES DAILY
COMMUNITY
End: 2021-09-15 | Stop reason: SDUPTHER

## 2021-09-14 RX ORDER — AMLODIPINE BESYLATE 10 MG/1
10 TABLET ORAL DAILY
Qty: 90 TABLET | Refills: 1 | Status: SHIPPED
Start: 2021-09-14 | End: 2021-11-09 | Stop reason: SDUPTHER

## 2021-09-14 ASSESSMENT — ENCOUNTER SYMPTOMS
WHEEZING: 0
ABDOMINAL PAIN: 0
SHORTNESS OF BREATH: 0
DIARRHEA: 0
CONSTIPATION: 0
NAUSEA: 0
BACK PAIN: 0
COUGH: 0
VOMITING: 0

## 2021-09-14 NOTE — PROGRESS NOTES
21  Jose Garvey : 1955 Sex: female  Age: 74 y.o. Chief Complaint   Patient presents with    Hypertension     on  had a procedure done and it started to be elevated when she was there- weds 180/190 ,190/97     HPI:  74 y.o. female presents today for ER follow up. Patient's chart, medical, surgical and medication history all reviewed. ER follow up  Date seen in the ER: 21  Symptoms: Elevated blood pressure  Labs/Imaging: CBC, CMP- normal; Troponin- mildly elevated; EKG- sinus bradycardia, incomplete RBBB  Diagnosis: Hypertension   Treatments: Amlodipine and Clonidine    Patient notes that she has not been feeling like herself for some time now. Having occasional lightheadedness, fatigue. Never had HTN prior to this past month. ROS:  Review of Systems   Constitutional: Positive for fatigue. Negative for chills and fever. Respiratory: Negative for cough, shortness of breath and wheezing. Cardiovascular: Negative for chest pain and palpitations. Gastrointestinal: Negative for abdominal pain, constipation, diarrhea, nausea and vomiting. Musculoskeletal: Negative for arthralgias and back pain. Skin: Negative for rash. Neurological: Positive for light-headedness. Negative for dizziness and headaches. Psychiatric/Behavioral: Negative for dysphoric mood. The patient is not nervous/anxious. All other systems reviewed and are negative.      Current Outpatient Medications on File Prior to Visit   Medication Sig Dispense Refill    sucralfate (CARAFATE) 1 GM/10ML suspension Take 1 g by mouth 4 times daily      cloNIDine (CATAPRES) 0.1 MG tablet Take 1 tablet by mouth 2 times daily as needed for High Blood Pressure 20 tablet 3    famotidine (PEPCID) 40 MG tablet Take 40 mg by mouth daily      oxybutynin (DITROPAN XL) 5 MG extended release tablet Take 1 tablet by mouth daily 30 tablet 3    cycloSPORINE (RESTASIS) 0.05 % ophthalmic emulsion 1 drop 2 times daily No current facility-administered medications on file prior to visit. Allergies   Allergen Reactions    Psudatabs [Pseudoephedrine Hcl]      Racing heart    Shellfish-Derived Products      Scallops         History reviewed. No pertinent past medical history. Past Surgical History:   Procedure Laterality Date    APPENDECTOMY      ARM SURGERY Left     cyst    LIPOSUCTION Bilateral     Upper extremity     Family History   Problem Relation Age of Onset    Stroke Mother     Thyroid Disease Mother     Heart Failure Brother      Social History     Socioeconomic History    Marital status:      Spouse name: Not on file    Number of children: Not on file    Years of education: Not on file    Highest education level: Not on file   Occupational History    Not on file   Tobacco Use    Smoking status: Never Smoker    Smokeless tobacco: Never Used   Substance and Sexual Activity    Alcohol use: Yes     Comment: occasional wine    Drug use: No    Sexual activity: Not on file   Other Topics Concern    Not on file   Social History Narrative    Not on file     Social Determinants of Health     Financial Resource Strain:     Difficulty of Paying Living Expenses:    Food Insecurity:     Worried About Running Out of Food in the Last Year:     Ran Out of Food in the Last Year:    Transportation Needs:     Lack of Transportation (Medical):      Lack of Transportation (Non-Medical):    Physical Activity:     Days of Exercise per Week:     Minutes of Exercise per Session:    Stress:     Feeling of Stress :    Social Connections:     Frequency of Communication with Friends and Family:     Frequency of Social Gatherings with Friends and Family:     Attends Zoroastrianism Services:     Active Member of Clubs or Organizations:     Attends Club or Organization Meetings:     Marital Status:    Intimate Partner Violence:     Fear of Current or Ex-Partner:     Emotionally Abused:     Physically Abused:  Sexually Abused:        Vitals:    09/14/21 1638   BP: 130/86   Pulse: 88   Temp: 97.8 °F (36.6 °C)   SpO2: 95%   Weight: 172 lb (78 kg)   Height: 5' 6\" (1.676 m)       Physical Exam:  Physical Exam  Vitals and nursing note reviewed. Constitutional:       General: She is not in acute distress. Appearance: Normal appearance. She is well-developed and normal weight. She is not ill-appearing. HENT:      Head: Normocephalic and atraumatic. Right Ear: Hearing and external ear normal.      Left Ear: Hearing and external ear normal.      Nose:      Comments: Wearing mask  Eyes:      General: Lids are normal. No scleral icterus. Extraocular Movements: Extraocular movements intact. Conjunctiva/sclera: Conjunctivae normal.   Neck:      Thyroid: No thyromegaly. Vascular: No carotid bruit. Cardiovascular:      Rate and Rhythm: Normal rate and regular rhythm. Heart sounds: Normal heart sounds. No murmur heard. Pulmonary:      Effort: Pulmonary effort is normal. No respiratory distress. Breath sounds: Normal breath sounds. No wheezing. Musculoskeletal:         General: No tenderness or deformity. Normal range of motion. Cervical back: Normal range of motion and neck supple. Right lower leg: No edema. Left lower leg: No edema. Lymphadenopathy:      Cervical: No cervical adenopathy. Skin:     General: Skin is warm and dry. Findings: No rash. Neurological:      General: No focal deficit present. Mental Status: She is alert and oriented to person, place, and time. Gait: Gait normal.   Psychiatric:         Mood and Affect: Mood and affect normal.         Speech: Speech normal.         Behavior: Behavior normal.         Thought Content:  Thought content normal.         Labs:  CBC with Differential:    Lab Results   Component Value Date    WBC 11.9 09/08/2021    RBC 5.03 09/08/2021    HGB 15.2 09/08/2021    HCT 46.8 09/08/2021     09/08/2021 MCV 93.0 09/08/2021    MCH 30.2 09/08/2021    MCHC 32.5 09/08/2021    RDW 13.2 09/08/2021    LYMPHOPCT 34.7 09/08/2021    MONOPCT 11.2 09/08/2021    BASOPCT 1.0 09/08/2021    MONOSABS 1.34 09/08/2021    LYMPHSABS 4.14 09/08/2021    EOSABS 0.29 09/08/2021    BASOSABS 0.12 09/08/2021     CMP:    Lab Results   Component Value Date     09/08/2021    K 4.4 09/08/2021     09/08/2021    CO2 28 09/08/2021    BUN 10 09/08/2021    CREATININE 0.8 09/08/2021    GFRAA >60 09/08/2021    LABGLOM >60 09/08/2021    GLUCOSE 93 09/08/2021    PROT 7.9 09/08/2021    LABALBU 4.5 09/08/2021    CALCIUM 9.8 09/08/2021    BILITOT 0.4 09/08/2021    ALKPHOS 81 09/08/2021    AST 23 09/08/2021    ALT 18 09/08/2021     Troponin:  No results found for: TROPONINI  U/A:    Lab Results   Component Value Date    NITRITE negative 08/09/2021    COLORU yellow 08/09/2021    COLORU Yellow 01/11/2021    PROTEINU negative 08/09/2021    PROTEINU Negative 01/11/2021    PHUR 6.0 08/09/2021    PHUR 6.0 01/11/2021    WBCUA 2-5 01/11/2021    RBCUA 1-3 01/11/2021    BACTERIA RARE 01/11/2021    CLARITYU clear 08/09/2021    CLARITYU Clear 01/11/2021    SPECGRAV 1.025 08/09/2021    SPECGRAV 1.025 01/11/2021    LEUKOCYTESUR small 08/09/2021    LEUKOCYTESUR SMALL 01/11/2021    UROBILINOGEN 0.2 01/11/2021    BILIRUBINUR negative 08/09/2021    BLOODU trace 08/09/2021    BLOODU TRACE-INTACT 01/11/2021    GLUCOSEU negative 08/09/2021    GLUCOSEU Negative 01/11/2021    AMORPHOUS FEW 10/28/2016     HgBA1c:    Lab Results   Component Value Date    LABA1C 5.3 01/11/2021     FLP:    Lab Results   Component Value Date    TRIG 101 01/11/2021    HDL 73 01/11/2021    LDLCALC 146 01/11/2021    LABVLDL 20 01/11/2021     TSH:    Lab Results   Component Value Date    TSH 3.480 01/11/2021        Assessment and Plan:  Nichole De Dios was seen today for hypertension. Diagnoses and all orders for this visit:    Essential hypertension  -     amLODIPine (NORVASC) 10 MG tablet;  Take 1 tablet by mouth daily  -     NM MYOCARDIAL SPECT REST EXERCISE OR RX; Future  -     ECHO Complete 2D W Doppler W Color; Future    Abnormal EKG  -     NM MYOCARDIAL SPECT REST EXERCISE OR RX; Future  -     ECHO Complete 2D W Doppler W Color; Future    Elevated troponin  -     NM MYOCARDIAL SPECT REST EXERCISE OR RX; Future    Incomplete RBBB  -     NM MYOCARDIAL SPECT REST EXERCISE OR RX; Future    Other fatigue  -     ECHO Complete 2D W Doppler W Color; Future    BP well controlled in the office today, but patient monitoring at home and she continues to have spikes into the 415-556P systolic. Having episodes of lightheadedness and fatigue. Abnormal EKG in the ER with mild elevated troponin. Will check Echo and stress test to rule out CAD as cause for sudden elevated BP. Return after testing.       Seen By:  Omar Horn,

## 2021-09-15 ENCOUNTER — TELEPHONE (OUTPATIENT)
Dept: PRIMARY CARE CLINIC | Age: 66
End: 2021-09-15

## 2021-09-15 RX ORDER — SUCRALFATE ORAL 1 G/10ML
1 SUSPENSION ORAL 4 TIMES DAILY
Qty: 1200 ML | Refills: 1 | Status: SHIPPED
Start: 2021-09-15 | End: 2021-11-12

## 2021-09-15 NOTE — TELEPHONE ENCOUNTER
Pt calling in and asking if the stress test can be switched to a CT scan. States she went home and spoke with her family and they all agree that a CT would be better for her. Also states she was supposed to get sucralfate script but did not.

## 2021-09-15 NOTE — TELEPHONE ENCOUNTER
Spoke with patient. She states she spoke to a friend of the family who had the same symptoms she did and his stress test turned out fine. Once they did a CT of his blood vessels he ended up having a quadruple bypass. She also states she googled everything and what she read lead to a CT. She states if you feel the stress test is better she will do it and go from there.

## 2021-09-15 NOTE — TELEPHONE ENCOUNTER
A CT will not tell me the same information about her heart that a stress test will.   I don't feel this is appropriate for what we are looking for

## 2021-09-30 ENCOUNTER — TELEPHONE (OUTPATIENT)
Dept: CARDIOLOGY CLINIC | Age: 66
End: 2021-09-30

## 2021-09-30 NOTE — TELEPHONE ENCOUNTER
Patient is scheduled at [address] [place] [address] and they are unable to reschedule sooner, I spoke with stress and echo lab downstairs and they said they can do the stress test next week, echo has to wait they are booked.

## 2021-09-30 NOTE — TELEPHONE ENCOUNTER
That helps. Have stress done then. Safia Guillen D.O.   Cardiologist  Cardiology, [address] [facility]

## 2021-09-30 NOTE — TELEPHONE ENCOUNTER
----- Message from Anabell Donnelly DO sent at 9/30/2021  9:59 AM EDT -----  Regarding: stress and echo  Was contacted by a physician to see if we can move this patient's stress and echo up if at all possible. Can you check into in for me? Anabell Donnelly D.O.   Cardiologist  Cardiology, [address] [facility]

## 2021-10-01 ENCOUNTER — TELEPHONE (OUTPATIENT)
Dept: CARDIOLOGY | Age: 66
End: 2021-10-01

## 2021-10-01 NOTE — TELEPHONE ENCOUNTER
I notified patient that someone will be calling from our stress lab to scheduled for next week, she is aware that echo has to wait.

## 2021-10-04 ENCOUNTER — TELEPHONE (OUTPATIENT)
Dept: CARDIOLOGY | Age: 66
End: 2021-10-04

## 2021-10-04 NOTE — TELEPHONE ENCOUNTER
Spoke with patient and confirmed nuclear stress test appointment on Oct. 6, 2021 at 0700. Instructions for test and COVID-19 preprocedure checklist reviewed with patient, questions answered.

## 2021-10-06 ENCOUNTER — HOSPITAL ENCOUNTER (OUTPATIENT)
Dept: CARDIOLOGY | Age: 66
Discharge: HOME | End: 2021-10-06
Payer: MEDICARE

## 2021-10-06 VITALS
WEIGHT: 172 LBS | HEIGHT: 66 IN | HEART RATE: 67 BPM | SYSTOLIC BLOOD PRESSURE: 138 MMHG | BODY MASS INDEX: 27.64 KG/M2 | DIASTOLIC BLOOD PRESSURE: 60 MMHG

## 2021-10-06 DIAGNOSIS — I45.10 INCOMPLETE RBBB: ICD-10-CM

## 2021-10-06 DIAGNOSIS — R53.83 OTHER FATIGUE: ICD-10-CM

## 2021-10-06 DIAGNOSIS — R94.31 ABNORMAL EKG: Primary | ICD-10-CM

## 2021-10-06 DIAGNOSIS — I10 ESSENTIAL HYPERTENSION: ICD-10-CM

## 2021-10-06 DIAGNOSIS — R94.31 ABNORMAL EKG: ICD-10-CM

## 2021-10-06 DIAGNOSIS — R77.8 ELEVATED TROPONIN: ICD-10-CM

## 2021-10-06 LAB
LV EF: 73 %
LV EF: 79 %
LVEF MODALITY: NORMAL
LVEF MODALITY: NORMAL

## 2021-10-06 PROCEDURE — 2580000003 HC RX 258: Performed by: INTERNAL MEDICINE

## 2021-10-06 PROCEDURE — 78452 HT MUSCLE IMAGE SPECT MULT: CPT

## 2021-10-06 PROCEDURE — 93306 TTE W/DOPPLER COMPLETE: CPT

## 2021-10-06 PROCEDURE — 3430000000 HC RX DIAGNOSTIC RADIOPHARMACEUTICAL: Performed by: INTERNAL MEDICINE

## 2021-10-06 PROCEDURE — 93017 CV STRESS TEST TRACING ONLY: CPT

## 2021-10-06 PROCEDURE — A9500 TC99M SESTAMIBI: HCPCS | Performed by: INTERNAL MEDICINE

## 2021-10-06 RX ORDER — SODIUM CHLORIDE 0.9 % (FLUSH) 0.9 %
10 SYRINGE (ML) INJECTION PRN
Status: DISCONTINUED | OUTPATIENT
Start: 2021-10-06 | End: 2021-10-07 | Stop reason: HOSPADM

## 2021-10-06 RX ADMIN — SODIUM CHLORIDE, PRESERVATIVE FREE 10 ML: 5 INJECTION INTRAVENOUS at 09:21

## 2021-10-06 RX ADMIN — SODIUM CHLORIDE, PRESERVATIVE FREE 10 ML: 5 INJECTION INTRAVENOUS at 07:13

## 2021-10-06 RX ADMIN — Medication 31.1 MILLICURIE: at 09:21

## 2021-10-06 RX ADMIN — Medication 10.4 MILLICURIE: at 07:13

## 2021-10-06 NOTE — PROCEDURES
[address] and [facility] [address] [phone]                Exercise Stress Nuclear Gated SPECT Study    Name: Stacey Robles Account Number: [de-identified]    :  1955      Sex: female              Date of Study:  10/6/2021    Height: 5' 6\" (167.6 cm)  Weight: 172 lb (78 kg)     Ordering Provider: Jenniffer Riggins DO     PCP: Jenniffer Riggins DO      Cardiologist: Johann Lundberg                        Interpreting Physician: Eliezer Carranza MD  _________________________________________________________________________________    Indication:   Detecting the presence and location of coronary artery disease   Risk Stratification Preoperative     Clinical History:   Patient has no known history of coronary artery disease. Resting ECG:    Normal sinus rhythm 67 bpm.  Normal axis. Incomplete right bundle branch block QRS duration 110 ms. No ST or T wave changes. Exercise: The patient exercised using a Juvenal protocol, completing 6:04 minutes and reaching an estimated work load of 7.0 metabolic equivalents (METS). Resting HR was 67. Peak exercise heart rate was 128 ( 84% of maximum predicted heart rate for age). Baseline /60. Peak exercise /82. The blood pressure response to exercise was normal.      Exercise was terminated due to dyspnea, fatigue and heart rate attained. The patient experienced no chest pain with exercise. Exercise ECG:   The patient demonstrated no arrhythmias during exercise. With exercise, there were no ST segment changes of significance at the heart rate achieved. Rojas treadmill score was 6 implying low risk. IMAGING: Myocardial perfusion imaging was performed at rest 30-35 minutes following the intravenous injection of 10.4 mCi of (Tc-Sestamibi) followed by 10 ml of Normal Saline.   At peak exercise, the patient was injected intravenously with 31.1 mCi of (Tc-Sestamibi) followed by 10 ml of Normal Saline. Gated post-stress tomographic imaging was performed 20-25 minutes after stress. FINDINGS: The overall quality of the study was good. Left ventricular cavity size was noted to be normal.    Rotational analog analysis demonstrated abnormal patient motion and soft tissue breast attenuation. The gated SPECT stress imaging in the short, vertical long, and horizontal long axis demonstrated normal homogeneous tracer distribution throughout the myocardium. Gated SPECT left ventricular ejection fraction was calculated to be 79%, with normal myocardial thickening and wall motion. TID ratio 0.89. Impression:    1. Exercise EKG was negative for ischemia at slightly submaximal exercise (84% MPHR). 2. The patient experienced no chest pain with exercise. 3. The myocardial perfusion imaging was normal.    4. Overall left ventricular systolic function was normal without regional wall motion abnormalities. 5. Rojas treadmill score was 6 implying low risk. 6. Exercise capacity was average. 7. Low risk general exercise treadmill test.    Thank you for sending your patient to this [employer].      Electronically signed by Salvatore Phillips MD on 10/6/21 at 3:54 PM EDT

## 2021-10-07 DIAGNOSIS — R93.1 ABNORMAL ECHOCARDIOGRAM: Primary | ICD-10-CM

## 2021-10-07 DIAGNOSIS — R53.83 OTHER FATIGUE: ICD-10-CM

## 2021-10-07 NOTE — PROGRESS NOTES
Spoke with patient regarding stress test and Echo. Mild hypertrophy on LV and septum but no outflow obstruction. Stress normal.  She would feel better about evaluation by cardiology.

## 2021-10-08 ENCOUNTER — TELEPHONE (OUTPATIENT)
Dept: SURGERY | Age: 66
End: 2021-10-08

## 2021-10-08 ENCOUNTER — TELEPHONE (OUTPATIENT)
Dept: ADMINISTRATIVE | Age: 66
End: 2021-10-08

## 2021-10-08 NOTE — TELEPHONE ENCOUNTER
Patient called and cancelled her gallbladder surgery due to other health issues. Patient will call to reschedule when she is able.   Electronically signed by Linda Merida MA on 10/8/2021 at 2:27 PM

## 2021-10-08 NOTE — TELEPHONE ENCOUNTER
NP returning call to schedule with Cardiology. Patient Appointment Form:      PCP: Dr. Diana Guevara  Referring: Dr. Diana Guevara    Has the Patient:    Seen a Cardiologist? No    Had a heart catheterization? No    Had heart surgery? No    Had a stress test or nuclear stress test? Yes 10/6/21 Epic     Had an echocardiogram? Yes 10/6/21 Epic     Had a vascular ultrasound? No    Had a 24/48 heart monitor or extended cardiac event monitor? No    Had recent blood work in the last 6 months? Some 8/9/21 9/8/21 Epic     Had a pacemaker/ICD/ILR implant? No    Seen an Electrophysiologist? No        Will send records via: All recs in Epic      Date & time of appointment:  10/13/21 @ 12:45 with Dr. Cheikh Christie.

## 2021-10-13 ENCOUNTER — OFFICE VISIT (OUTPATIENT)
Dept: CARDIOLOGY CLINIC | Age: 66
End: 2021-10-13
Payer: MEDICARE

## 2021-10-13 VITALS
SYSTOLIC BLOOD PRESSURE: 140 MMHG | DIASTOLIC BLOOD PRESSURE: 78 MMHG | BODY MASS INDEX: 27.32 KG/M2 | WEIGHT: 170 LBS | HEART RATE: 80 BPM | RESPIRATION RATE: 16 BRPM | HEIGHT: 66 IN

## 2021-10-13 DIAGNOSIS — I10 PRIMARY HYPERTENSION: ICD-10-CM

## 2021-10-13 PROCEDURE — G8399 PT W/DXA RESULTS DOCUMENT: HCPCS | Performed by: INTERNAL MEDICINE

## 2021-10-13 PROCEDURE — G8417 CALC BMI ABV UP PARAM F/U: HCPCS | Performed by: INTERNAL MEDICINE

## 2021-10-13 PROCEDURE — G8484 FLU IMMUNIZE NO ADMIN: HCPCS | Performed by: INTERNAL MEDICINE

## 2021-10-13 PROCEDURE — 99204 OFFICE O/P NEW MOD 45 MIN: CPT | Performed by: INTERNAL MEDICINE

## 2021-10-13 PROCEDURE — 4040F PNEUMOC VAC/ADMIN/RCVD: CPT | Performed by: INTERNAL MEDICINE

## 2021-10-13 PROCEDURE — 1123F ACP DISCUSS/DSCN MKR DOCD: CPT | Performed by: INTERNAL MEDICINE

## 2021-10-13 PROCEDURE — G8427 DOCREV CUR MEDS BY ELIG CLIN: HCPCS | Performed by: INTERNAL MEDICINE

## 2021-10-13 PROCEDURE — 3017F COLORECTAL CA SCREEN DOC REV: CPT | Performed by: INTERNAL MEDICINE

## 2021-10-13 PROCEDURE — 93000 ELECTROCARDIOGRAM COMPLETE: CPT | Performed by: INTERNAL MEDICINE

## 2021-10-13 PROCEDURE — 1036F TOBACCO NON-USER: CPT | Performed by: INTERNAL MEDICINE

## 2021-10-13 PROCEDURE — 1090F PRES/ABSN URINE INCON ASSESS: CPT | Performed by: INTERNAL MEDICINE

## 2021-10-13 NOTE — PROGRESS NOTES
Chief Complaint   Patient presents with    Heart Murmur       Patient Active Problem List    Diagnosis Date Noted    HTN (hypertension) 10/13/2021    OAB (overactive bladder) 06/03/2021    Varicose veins 09/29/2014       Current Outpatient Medications   Medication Sig Dispense Refill    Sucralfate (CARAFATE PO) Take by mouth Taking twice a day      sucralfate (CARAFATE) 1 GM/10ML suspension Take 10 mLs by mouth 4 times daily (Patient taking differently: Take 1 g by mouth as needed Not takung) 1200 mL 1    amLODIPine (NORVASC) 10 MG tablet Take 1 tablet by mouth daily 90 tablet 1    cycloSPORINE (RESTASIS) 0.05 % ophthalmic emulsion 1 drop 2 times daily       No current facility-administered medications for this visit. Allergies   Allergen Reactions    Psudatabs [Pseudoephedrine Hcl]      Racing heart    Shellfish-Derived Products      Scallops digestive problems         Vitals:    10/13/21 1253   BP: (!) 140/78   Pulse: 80   Resp: 16   Weight: 170 lb (77.1 kg)   Height: 5' 6\" (1.676 m)       Social History     Socioeconomic History    Marital status:      Spouse name: Not on file    Number of children: Not on file    Years of education: Not on file    Highest education level: Not on file   Occupational History    Not on file   Tobacco Use    Smoking status: Never Smoker    Smokeless tobacco: Never Used   Vaping Use    Vaping Use: Never used   Substance and Sexual Activity    Alcohol use: Yes     Comment: occasional wine    Drug use: No    Sexual activity: Not on file   Other Topics Concern    Not on file   Social History Narrative    Not on file     Social Determinants of Health     Financial Resource Strain:     Difficulty of Paying Living Expenses:    Food Insecurity:     Worried About Running Out of Food in the Last Year:     Orestes of Food in the Last Year:    Transportation Needs:     Lack of Transportation (Medical):      Lack of Transportation (Non-Medical): Physical Activity:     Days of Exercise per Week:     Minutes of Exercise per Session:    Stress:     Feeling of Stress :    Social Connections:     Frequency of Communication with Friends and Family:     Frequency of Social Gatherings with Friends and Family:     Attends Yazidism Services:     Active Member of Clubs or Organizations:     Attends Club or Organization Meetings:     Marital Status:    Intimate Partner Violence:     Fear of Current or Ex-Partner:     Emotionally Abused:     Physically Abused:     Sexually Abused:        Family History   Problem Relation Age of Onset    Stroke Mother     Thyroid Disease Mother     Alcohol Abuse Father     Heart Failure Brother         smoked 3 tours in [address]: Fina Mullen presents to the office today for consult - dr Mason Ya - for cardiac evaluation. She complains of dyspnea and fatigue and denies   exertional chest pressure/discomfort, orthopnea, palpitations, paroxysmal nocturnal dyspnea and syncope. Recently had BP elevation detected at GYN procedure, ED documented 207/107, started on amlodipine, home BPs improved, but still on the high side  ETT-N and Echo obtained, I reviewed reports and actual images. Does walk for exercise, no substance abuse        Review of Systems:   Heart: as above   Lungs: as above   Eyes: denies changes in vision or discharge. Ears: denies changes in hearing or pain. Nose: denies epistaxis or masses   Throat: denies sore throat or trouble swallowing. Neuro: denies numbness, tingling, tremors. Skin: denies rashes or itching. : denies hematuria, dysuria   GI: denies vomiting, diarrhea   Psych: denies mood changed, anxiety, depression. all others negative. Physical Exam   BP (!) 140/78   Pulse 80   Resp 16   Ht 5' 6\" (1.676 m)   Wt 170 lb (77.1 kg)   BMI 27.44 kg/m²   Constitutional: Oriented to person, place, and time. Well-developed and well-nourished. No distress. Head: Normocephalic and atraumatic. Eyes: EOM are normal. Pupils are equal, round, and reactive to light. Neck: Normal range of motion. Neck supple. No hepatojugular reflux and no JVD present. Carotid bruit is not present. Cardiovascular: Normal rate, regular rhythm, normal heart sounds and intact distal pulses. Exam reveals no gallop and no friction rub. Grade I/VI short SHA aortic focus. Pulmonary/Chest: Effort normal and breath sounds normal. No respiratory distress. No wheezes. No rales. Abdominal: Soft. Bowel sounds are normal. No distension and no mass. No tenderness. No rebound and no guarding. Musculoskeletal: Normal range of motion. No edema and no tenderness. Neurological: Alert and oriented to person, place, and time. Skin: Skin is warm and dry. No rash noted. Not diaphoretic. No erythema. Psychiatric: Normal mood and affect. Behavior is normal.     EKG:  normal EKG, normal sinus rhythm, rate 80, RSR'.     ASSESSMENT AND PLAN:  Patient Active Problem List   Diagnosis    Varicose veins    OAB (overactive bladder)    HTN (hypertension)     Patient with various symptoms, only true cardiac one could possibly her sense that she cannot keep up with her friends with walking  This certainly could be due to increased wall thickness from HTN, likely longer than recent detection  I reviewed reports with patient - echo shows mild wall thickening secondary to HTN, and no significant valvular disease  She has a hyperdynamic LV, and if she does not have satisfactory BP reduction and exercise capacity with her current med, I would recommend changing her to verapamil or cardizem, in concert with an ACE or ARB  She demonstrated normal functional capacity at time of stress, and no evidence of ischemia  We also discussed non pharmacologic approach to HTN as well  Casa Maldonado M.D  [facility]

## 2021-10-18 ENCOUNTER — TELEPHONE (OUTPATIENT)
Dept: PRIMARY CARE CLINIC | Age: 66
End: 2021-10-18

## 2021-10-18 NOTE — TELEPHONE ENCOUNTER
Cardiac rehab is specific to those who have had a cardiac event. I would recommend she simply start a walking program- 30 minutes, moderate intensity 5 days/week.   If she is able to keep her heart rate up around 120-125 for 30 minutes, she is achieving cardiovascular benefit

## 2021-10-18 NOTE — TELEPHONE ENCOUNTER
The pt went and saw Dr. Morales Hsu and really all he did is confirm the results of her testing that she does have hardening of the heart due to her BP being elevated, that there is nothing that he can do that he would just prefer that you handle her care.  She is calling to see if she can get a referral to cardiac rehab because she would like to start exercising and needs some education on her heart rate and so forth

## 2021-11-09 DIAGNOSIS — I10 ESSENTIAL HYPERTENSION: ICD-10-CM

## 2021-11-09 RX ORDER — AMLODIPINE BESYLATE 10 MG/1
10 TABLET ORAL DAILY
Qty: 90 TABLET | Refills: 1 | Status: SHIPPED | OUTPATIENT
Start: 2021-11-09

## 2021-11-09 NOTE — TELEPHONE ENCOUNTER
----- Message from [facility] sent at 11/9/2021 12:26 PM EST -----  Subject: Refill Request    QUESTIONS  Name of Medication? amLODIPine (NORVASC) 10 MG tablet  Patient-reported dosage and instructions? 10 mg 1 tablet daily  How many days do you have left? 1  Preferred Pharmacy? [facility] phone number (if available)? [phone]  Additional Information for Provider? Pt. has an appt is 11/12/2021.  ---------------------------------------------------------------------------  --------------  CALL BACK INFO  What is the best way for the office to contact you? OK to leave message on   voicemail  Preferred Call Back Phone Number?  [phone]

## 2021-11-12 ENCOUNTER — TELEPHONE (OUTPATIENT)
Dept: FAMILY MEDICINE CLINIC | Age: 66
End: 2021-11-12

## 2021-11-12 ENCOUNTER — OFFICE VISIT (OUTPATIENT)
Dept: FAMILY MEDICINE CLINIC | Age: 66
End: 2021-11-12
Payer: MEDICARE

## 2021-11-12 VITALS
DIASTOLIC BLOOD PRESSURE: 82 MMHG | TEMPERATURE: 97.5 F | HEART RATE: 77 BPM | HEIGHT: 67 IN | RESPIRATION RATE: 16 BRPM | BODY MASS INDEX: 26.51 KG/M2 | WEIGHT: 168.9 LBS | SYSTOLIC BLOOD PRESSURE: 138 MMHG | OXYGEN SATURATION: 95 %

## 2021-11-12 DIAGNOSIS — E55.9 VITAMIN D INSUFFICIENCY: ICD-10-CM

## 2021-11-12 DIAGNOSIS — I10 ESSENTIAL HYPERTENSION: ICD-10-CM

## 2021-11-12 DIAGNOSIS — R73.01 IMPAIRED FASTING GLUCOSE: ICD-10-CM

## 2021-11-12 DIAGNOSIS — Z00.00 ROUTINE GENERAL MEDICAL EXAMINATION AT A HEALTH CARE FACILITY: Primary | ICD-10-CM

## 2021-11-12 PROCEDURE — 3017F COLORECTAL CA SCREEN DOC REV: CPT | Performed by: FAMILY MEDICINE

## 2021-11-12 PROCEDURE — G0438 PPPS, INITIAL VISIT: HCPCS | Performed by: FAMILY MEDICINE

## 2021-11-12 PROCEDURE — 1123F ACP DISCUSS/DSCN MKR DOCD: CPT | Performed by: FAMILY MEDICINE

## 2021-11-12 PROCEDURE — G8484 FLU IMMUNIZE NO ADMIN: HCPCS | Performed by: FAMILY MEDICINE

## 2021-11-12 PROCEDURE — 4040F PNEUMOC VAC/ADMIN/RCVD: CPT | Performed by: FAMILY MEDICINE

## 2021-11-12 SDOH — ECONOMIC STABILITY: FOOD INSECURITY: WITHIN THE PAST 12 MONTHS, THE FOOD YOU BOUGHT JUST DIDN'T LAST AND YOU DIDN'T HAVE MONEY TO GET MORE.: NEVER TRUE

## 2021-11-12 SDOH — ECONOMIC STABILITY: FOOD INSECURITY: WITHIN THE PAST 12 MONTHS, YOU WORRIED THAT YOUR FOOD WOULD RUN OUT BEFORE YOU GOT MONEY TO BUY MORE.: NEVER TRUE

## 2021-11-12 ASSESSMENT — LIFESTYLE VARIABLES
HOW OFTEN DO YOU HAVE A DRINK CONTAINING ALCOHOL: 2
HOW OFTEN DURING THE LAST YEAR HAVE YOU BEEN UNABLE TO REMEMBER WHAT HAPPENED THE NIGHT BEFORE BECAUSE YOU HAD BEEN DRINKING: 0
HOW OFTEN DURING THE LAST YEAR HAVE YOU FOUND THAT YOU WERE NOT ABLE TO STOP DRINKING ONCE YOU HAD STARTED: 0
HOW OFTEN DURING THE LAST YEAR HAVE YOU FAILED TO DO WHAT WAS NORMALLY EXPECTED FROM YOU BECAUSE OF DRINKING: 0
AUDIT-C TOTAL SCORE: 2
HAS A RELATIVE, FRIEND, DOCTOR, OR ANOTHER HEALTH PROFESSIONAL EXPRESSED CONCERN ABOUT YOUR DRINKING OR SUGGESTED YOU CUT DOWN: 0
AUDIT TOTAL SCORE: 2
HOW OFTEN DURING THE LAST YEAR HAVE YOU HAD A FEELING OF GUILT OR REMORSE AFTER DRINKING: 0
HOW OFTEN DO YOU HAVE SIX OR MORE DRINKS ON ONE OCCASION: 0
HOW MANY STANDARD DRINKS CONTAINING ALCOHOL DO YOU HAVE ON A TYPICAL DAY: 0
HAVE YOU OR SOMEONE ELSE BEEN INJURED AS A RESULT OF YOUR DRINKING: 0
HOW OFTEN DURING THE LAST YEAR HAVE YOU NEEDED AN ALCOHOLIC DRINK FIRST THING IN THE MORNING TO GET YOURSELF GOING AFTER A NIGHT OF HEAVY DRINKING: 0

## 2021-11-12 ASSESSMENT — PATIENT HEALTH QUESTIONNAIRE - PHQ9
2. FEELING DOWN, DEPRESSED OR HOPELESS: 0
1. LITTLE INTEREST OR PLEASURE IN DOING THINGS: 0
SUM OF ALL RESPONSES TO PHQ QUESTIONS 1-9: 0
SUM OF ALL RESPONSES TO PHQ9 QUESTIONS 1 & 2: 0
SUM OF ALL RESPONSES TO PHQ QUESTIONS 1-9: 0
SUM OF ALL RESPONSES TO PHQ QUESTIONS 1-9: 0

## 2021-11-12 ASSESSMENT — SOCIAL DETERMINANTS OF HEALTH (SDOH): HOW HARD IS IT FOR YOU TO PAY FOR THE VERY BASICS LIKE FOOD, HOUSING, MEDICAL CARE, AND HEATING?: NOT HARD AT ALL

## 2021-11-12 NOTE — PATIENT INSTRUCTIONS
Personalized Preventive Plan for Belen Barrett - 11/12/2021  Medicare offers a range of preventive health benefits. Some of the tests and screenings are paid in full while other may be subject to a deductible, co-insurance, and/or copay. Some of these benefits include a comprehensive review of your medical history including lifestyle, illnesses that may run in your family, and various assessments and screenings as appropriate. After reviewing your medical record and screening and assessments performed today your provider may have ordered immunizations, labs, imaging, and/or referrals for you. A list of these orders (if applicable) as well as your Preventive Care list are included within your After Visit Summary for your review. Other Preventive Recommendations:    · A preventive eye exam performed by an eye specialist is recommended every 1-2 years to screen for glaucoma; cataracts, macular degeneration, and other eye disorders. · A preventive dental visit is recommended every 6 months. · Try to get at least 150 minutes of exercise per week or 10,000 steps per day on a pedometer . · Order or download the FREE \"Exercise & Physical Activity: Your Everyday Guide\" from [employer]. Call 3-150.976.5681 or search [employer] online. · You need 3657-2416 mg of calcium and 9219-7539 IU of vitamin D per day. It is possible to meet your calcium requirement with diet alone, but a vitamin D supplement is usually necessary to meet this goal.  · When exposed to the sun, use a sunscreen that protects against both UVA and UVB radiation with an SPF of 30 or greater. Reapply every 2 to 3 hours or after sweating, drying off with a towel, or swimming. · Always wear a seat belt when traveling in a car. Always wear a helmet when riding a bicycle or motorcycle.

## 2021-11-12 NOTE — PROGRESS NOTES
Medicare Annual Wellness Visit  Name: Delfina Barrios Date: 2021   MRN: [id number] Sex: Female   Age: 74 y.o. Ethnicity: Non- / Non    : 1955 Race: White (non-)      Anne Borjas is here for Medicare AWV and Hypertension    Screenings for behavioral, psychosocial and functional/safety risks, and cognitive dysfunction are all negative except as indicated below. These results, as well as other patient data from the [address] form, are documented in Flowsheets linked to this Encounter. Allergies   Allergen Reactions    Psudatabs [Pseudoephedrine Hcl]      Racing heart    Shellfish-Derived Products      Scallops digestive problems           Prior to Visit Medications    Medication Sig Taking?  Authorizing Provider   amLODIPine (NORVASC) 10 MG tablet Take 1 tablet by mouth daily Yes Daniel Perez DO   cycloSPORINE (RESTASIS) 0.05 % ophthalmic emulsion 1 drop 2 times daily Yes Historical Provider, MD         Past Medical History:   Diagnosis Date    Hypertension     Visual disturbance     history of stigmatism since childhood       Past Surgical History:   Procedure Laterality Date    APPENDECTOMY      ARM SURGERY Left     cyst    LIPOSUCTION Bilateral     Upper extremity         Family History   Problem Relation Age of Onset    Stroke Mother     Thyroid Disease Mother     Alcohol Abuse Father     Heart Failure Brother         smoked 3 tours in [place] (Including outside providers/suppliers regularly involved in providing care):   Patient Care Team:  Daniel Perez DO as PCP - General (Family Medicine)  Daniel Perez DO as PCP - [facility] Empaneled Provider    Wt Readings from Last 3 Encounters:   21 168 lb 14.4 oz (76.6 kg)   10/13/21 170 lb (77.1 kg)   10/06/21 172 lb (78 kg)     Vitals:    21 1108   BP: 138/82   Pulse: 77   Resp: 16   Temp: 97.5 °F (36.4 °C)   TempSrc: Temporal   SpO2: 95%   Weight: 168 lb 14.4 oz (76.6 kg)   Height: 5' 7\" (1.702 m)     Body mass index is 26.45 kg/m². Based upon direct observation of the patient, evaluation of cognition reveals recent and remote memory intact. Physical Exam  Vitals and nursing note reviewed. Constitutional:       General: She is not in acute distress. Appearance: Normal appearance. She is well-developed and normal weight. She is not ill-appearing. HENT:      Head: Normocephalic and atraumatic. Right Ear: Hearing and external ear normal.      Left Ear: Hearing and external ear normal.      Nose:      Comments: Wearing mask  Eyes:      General: Lids are normal. No scleral icterus. Extraocular Movements: Extraocular movements intact. Conjunctiva/sclera: Conjunctivae normal.   Neck:      Thyroid: No thyromegaly. Vascular: No carotid bruit. Cardiovascular:      Rate and Rhythm: Normal rate and regular rhythm. Heart sounds: Normal heart sounds. No murmur heard. Pulmonary:      Effort: Pulmonary effort is normal. No respiratory distress. Breath sounds: Normal breath sounds. No wheezing. Musculoskeletal:         General: No tenderness or deformity. Normal range of motion. Cervical back: Normal range of motion and neck supple. Right lower leg: No edema. Left lower leg: No edema. Lymphadenopathy:      Cervical: No cervical adenopathy. Skin:     General: Skin is warm and dry. Findings: No rash. Neurological:      General: No focal deficit present. Mental Status: She is alert and oriented to person, place, and time. Gait: Gait normal.   Psychiatric:         Mood and Affect: Mood and affect normal.         Speech: Speech normal.         Behavior: Behavior normal.         Thought Content: Thought content normal.         Patient's complete Health Risk Assessment and screening values have been reviewed and are found in Flowsheets.  The following problems were reviewed today and where indicated follow up appointments were made and/or referrals ordered. Positive Risk Factor Screenings with Interventions:            General Health and ACP:  General  In general, how would you say your health is?: Fair  In the past 7 days, have you experienced any of the following? New or Increased Pain, New or Increased Fatigue, Loneliness, Social Isolation, Stress or Anger?: None of These  Do you get the social and emotional support that you need?: Yes  Do you have a Living Will?: Yes  Advance Directives     Power of [facility] Will ACP-Advance Directive ACP-Power of     Not on File Not on File Not on File Not on File      General Health Risk Interventions:  · Overall healthy 75 yo. BP well controlled. Due for labs.               Personalized Preventive Plan   Current Health Maintenance Status  Immunization History   Administered Date(s) Administered    COVID-19, Valentino Flattery, Primary or Immunocompromised, PF, 100mcg/0.5mL 02/11/2021, 03/11/2021    Influenza Virus Vaccine 01/15/2016    Influenza, Triv, inactivated, subunit, adjuvanted, IM (Fluad 65 yrs and older) 10/08/2019, 11/11/2020    Pneumococcal Conjugate 13-valent (Letty Mcknight) 10/08/2019    Pneumococcal Polysaccharide ([id number]) 11/11/2020    Zoster Live (Zostavax) 03/27/2013        Health Maintenance   Topic Date Due    Annual Wellness Visit (AWV)  Never done    Flu vaccine (1) 09/01/2021    COVID-19 Vaccine (3 - Booster for Moderna series) 09/11/2021    DTaP/Tdap/Td vaccine (1 - Tdap) 12/03/2021 (Originally 8/16/1972)    Shingles Vaccine (2 of 3) 11/12/2022 (Originally 5/22/2013)    Breast cancer screen  05/11/2022    Lipid screen  01/11/2026    Colon cancer screen colonoscopy  04/18/2026    DEXA (modify frequency per FRAX score)  Completed    Pneumococcal 65+ years Vaccine  Completed    Hepatitis A vaccine  Aged Out    Hepatitis B vaccine  Aged Out    Hib vaccine  Aged Out    Meningococcal (ACWY) vaccine  Aged Out    Hepatitis C screen  Discontinued     Recommendations for Preventive Services Due: see orders and patient instructions/AVS.    Recommended screening schedule for the next 5-10 years is provided to the patient in written form: see Patient Instructions/AVS.      Assessment and [address] was seen today for medicare awv and hypertension. Diagnoses and all orders for this visit:    Routine general medical examination at a [facility]  HRA reviewed and addressed. UTD on HM. Due for fasting labs. Essential hypertension  -     CBC Auto Differential; Future  -     Comprehensive Metabolic Panel; Future  -     Lipid Panel; Future  -     TSH without Reflex; Future  -     Urinalysis; Future  Well controlled. Due for labs. Impaired fasting glucose  -     Hemoglobin A1C; Future    Vitamin D insufficiency  -     Vitamin D 25 Hydroxy; Future        Return in about 6 months (around 5/12/2022), or if symptoms worsen or fail to improve, for Chronic medical conditions.       Seen By:  Vinicio Rodriguez, DO

## 2021-11-12 NOTE — TELEPHONE ENCOUNTER
Richy Christie was asking if she would be okay to get her covid booster that she has a head cold and congestion. Please advise.

## 2021-11-12 NOTE — TELEPHONE ENCOUNTER
Spoke with Kaylie Joseph, relayed the information that it was OK to get. Pt verbalized understanding.

## 2022-01-10 ENCOUNTER — TELEPHONE (OUTPATIENT)
Dept: PRIMARY CARE CLINIC | Age: 67
End: 2022-01-10

## 2022-01-10 DIAGNOSIS — I10 UNCONTROLLED HYPERTENSION: Primary | ICD-10-CM

## 2022-01-10 NOTE — TELEPHONE ENCOUNTER
Cardiology would not manage her BP.   I'd be happy to send her to Nephrology if she would like to see a specialist.

## 2022-01-10 NOTE — TELEPHONE ENCOUNTER
Patient has been taking clonidine for elevated blood pressure more frequently previously was rarely taking it but last week she took it 3 times. Wanting to know you recommendations. She is wanting to know if she should pursue seeing cardiology. He does not want to see Sixto Vang he told her 'too many cooks in the kitchen' and deferred to you.

## 2022-01-13 ENCOUNTER — TELEPHONE (OUTPATIENT)
Dept: PRIMARY CARE CLINIC | Age: 67
End: 2022-01-13

## 2022-01-13 DIAGNOSIS — I10 UNCONTROLLED HYPERTENSION: Primary | ICD-10-CM

## 2022-01-13 NOTE — TELEPHONE ENCOUNTER
Patient was referred to Dr. Cristela Galvez office. Spoke with  in the [facility] office who does the scheduling for [facility] and [facility] and patient was told that there are hundreds of people waiting to get it and it would be several months before she is seen. Asking if you would be able to refer to someone else.

## 2022-01-19 ENCOUNTER — TELEPHONE (OUTPATIENT)
Dept: PRIMARY CARE CLINIC | Age: 67
End: 2022-01-19

## 2022-01-19 NOTE — TELEPHONE ENCOUNTER
Dr. Igor Mercer is in the same office as Dr. Isidro Lomeli and my referral stated that she could be scheduled with any member of the group.   Do I need to place a new referral?

## 2022-01-19 NOTE — TELEPHONE ENCOUNTER
You referred the pt to a nephrologist for her uncontrolled hypertension and then had to refer her to another one because the first one couldn't get her in for awhile. She is calling because the second one you referred her to is saying the same that they can't get her in for awhile.  One of her friends suggested a Dr Nabil Ching so she called them to see how long it would be before she would be able to get in to see the doctor and they told her that it would be a month so the pt is calling to see if you can please refer her to see Dr. Nabil Ching instead of the other doctors

## 2022-04-05 ENCOUNTER — OFFICE VISIT (OUTPATIENT)
Dept: ENT CLINIC | Age: 67
End: 2022-04-05
Payer: MEDICARE

## 2022-04-05 VITALS
SYSTOLIC BLOOD PRESSURE: 134 MMHG | BODY MASS INDEX: 26.84 KG/M2 | OXYGEN SATURATION: 97 % | HEIGHT: 66 IN | TEMPERATURE: 98.1 F | HEART RATE: 71 BPM | DIASTOLIC BLOOD PRESSURE: 81 MMHG | WEIGHT: 167 LBS

## 2022-04-05 DIAGNOSIS — R09.82 POST-NASAL DRAINAGE: ICD-10-CM

## 2022-04-05 DIAGNOSIS — R09.81 NASAL CONGESTION: ICD-10-CM

## 2022-04-05 DIAGNOSIS — J32.9 CHRONIC SINUSITIS, UNSPECIFIED LOCATION: Primary | ICD-10-CM

## 2022-04-05 PROCEDURE — G8417 CALC BMI ABV UP PARAM F/U: HCPCS | Performed by: OTOLARYNGOLOGY

## 2022-04-05 PROCEDURE — G8427 DOCREV CUR MEDS BY ELIG CLIN: HCPCS | Performed by: OTOLARYNGOLOGY

## 2022-04-05 PROCEDURE — 1036F TOBACCO NON-USER: CPT | Performed by: OTOLARYNGOLOGY

## 2022-04-05 PROCEDURE — 1123F ACP DISCUSS/DSCN MKR DOCD: CPT | Performed by: OTOLARYNGOLOGY

## 2022-04-05 PROCEDURE — 99203 OFFICE O/P NEW LOW 30 MIN: CPT | Performed by: OTOLARYNGOLOGY

## 2022-04-05 PROCEDURE — 3017F COLORECTAL CA SCREEN DOC REV: CPT | Performed by: OTOLARYNGOLOGY

## 2022-04-05 PROCEDURE — 1090F PRES/ABSN URINE INCON ASSESS: CPT | Performed by: OTOLARYNGOLOGY

## 2022-04-05 PROCEDURE — G8399 PT W/DXA RESULTS DOCUMENT: HCPCS | Performed by: OTOLARYNGOLOGY

## 2022-04-05 PROCEDURE — 4040F PNEUMOC VAC/ADMIN/RCVD: CPT | Performed by: OTOLARYNGOLOGY

## 2022-04-05 RX ORDER — FLUTICASONE PROPIONATE 50 MCG
2 SPRAY, SUSPENSION (ML) NASAL DAILY
Qty: 16 G | Refills: 5 | Status: SHIPPED | OUTPATIENT
Start: 2022-04-05

## 2022-04-05 RX ORDER — CYCLOSPORINE 0 G/ML
SOLUTION/ DROPS OPHTHALMIC; TOPICAL
COMMUNITY
Start: 2022-02-28 | End: 2022-06-23

## 2022-04-05 ASSESSMENT — ENCOUNTER SYMPTOMS
SINUS PAIN: 1
EYE REDNESS: 0
COLOR CHANGE: 0
SHORTNESS OF BREATH: 0
ALLERGIC/IMMUNOLOGIC NEGATIVE: 1
VOMITING: 0
SINUS PRESSURE: 1
EYE DISCHARGE: 0
STRIDOR: 0
NAUSEA: 0
COUGH: 0

## 2022-04-13 ENCOUNTER — HOSPITAL ENCOUNTER (OUTPATIENT)
Dept: ULTRASOUND IMAGING | Age: 67
Discharge: HOME | End: 2022-04-15

## 2022-04-13 DIAGNOSIS — I10 HYPERTENSION, UNSPECIFIED TYPE: ICD-10-CM

## 2022-05-05 ENCOUNTER — OFFICE VISIT (OUTPATIENT)
Dept: ENT CLINIC | Age: 67
End: 2022-05-05
Payer: MEDICARE

## 2022-05-05 VITALS
BODY MASS INDEX: 26.84 KG/M2 | HEIGHT: 66 IN | OXYGEN SATURATION: 97 % | DIASTOLIC BLOOD PRESSURE: 79 MMHG | TEMPERATURE: 97.5 F | WEIGHT: 167 LBS | HEART RATE: 76 BPM | SYSTOLIC BLOOD PRESSURE: 126 MMHG

## 2022-05-05 DIAGNOSIS — R09.81 NASAL CONGESTION: ICD-10-CM

## 2022-05-05 DIAGNOSIS — R09.82 POST-NASAL DRAINAGE: ICD-10-CM

## 2022-05-05 DIAGNOSIS — J30.89 SEASONAL ALLERGIC RHINITIS DUE TO OTHER ALLERGIC TRIGGER: Primary | ICD-10-CM

## 2022-05-05 PROCEDURE — 99213 OFFICE O/P EST LOW 20 MIN: CPT | Performed by: OTOLARYNGOLOGY

## 2022-05-05 PROCEDURE — 1036F TOBACCO NON-USER: CPT | Performed by: OTOLARYNGOLOGY

## 2022-05-05 PROCEDURE — G8427 DOCREV CUR MEDS BY ELIG CLIN: HCPCS | Performed by: OTOLARYNGOLOGY

## 2022-05-05 PROCEDURE — 4040F PNEUMOC VAC/ADMIN/RCVD: CPT | Performed by: OTOLARYNGOLOGY

## 2022-05-05 PROCEDURE — G8417 CALC BMI ABV UP PARAM F/U: HCPCS | Performed by: OTOLARYNGOLOGY

## 2022-05-05 PROCEDURE — 1090F PRES/ABSN URINE INCON ASSESS: CPT | Performed by: OTOLARYNGOLOGY

## 2022-05-05 PROCEDURE — 1123F ACP DISCUSS/DSCN MKR DOCD: CPT | Performed by: OTOLARYNGOLOGY

## 2022-05-05 PROCEDURE — G8399 PT W/DXA RESULTS DOCUMENT: HCPCS | Performed by: OTOLARYNGOLOGY

## 2022-05-05 PROCEDURE — 3017F COLORECTAL CA SCREEN DOC REV: CPT | Performed by: OTOLARYNGOLOGY

## 2022-05-05 ASSESSMENT — ENCOUNTER SYMPTOMS
NAUSEA: 0
EYE DISCHARGE: 0
COUGH: 0
EYE REDNESS: 0
COLOR CHANGE: 0
SINUS PAIN: 1
SINUS PRESSURE: 1
ALLERGIC/IMMUNOLOGIC NEGATIVE: 1
SHORTNESS OF BREATH: 0
VOMITING: 0
STRIDOR: 0

## 2022-05-05 NOTE — PROGRESS NOTES
Subjective:     Patient ID:  Breanna Diaz is a 74 y.o. female. HPI:    Follow up for sinus problems. Placed on Flonase last visit. Been doing well. Sinusitis  Patient presents with chronic sinusitis for several years. Her symptoms include nasal congestion, clear rhinorrhea, purulent rhinorrhea, fevers. Is the condition interfering with work? no   How: retired    The patient takes antibiotics for sinusitis 3 times per  year. The patient has not had sinus surgery in the past.     Prior antibiotic therapy has includedAmoxicillin, Augmentin. For how lon month(s)    Other medications have included nothing additional       she has not had allergy testing     The patient has never had nasal polyps. The patient has no history of asthma. The patient has no history of eczema. Sleep study: no  NANDINI: no  CPAP: no    Sinus lavage: yes   Relief: poorrelief of symptoms. Headaches/Facial Pain: no       Nasal obstruction: no       The patients worst time of year is year round    Patient's medications, allergies, past medical,surgical, social and family histories were reviewed and updated as appropriate. Review of Systems   Constitutional: Negative for chills, fatigue and fever. HENT: Positive for postnasal drip, sinus pressure and sinus pain. Eyes: Negative for discharge and redness. Respiratory: Negative for cough, shortness of breath and stridor. Gastrointestinal: Negative for nausea and vomiting. Endocrine: Negative. Genitourinary: Negative. Musculoskeletal: Negative. Skin: Negative for color change and rash. Allergic/Immunologic: Negative. Neurological: Negative for dizziness, speech difficulty and headaches. Hematological: Negative. Psychiatric/Behavioral: Negative for agitation and confusion. All other systems reviewed and are negative. Objective:   Physical Exam  Constitutional:       Appearance: Normal appearance.    HENT:      Head: Normocephalic. Right Ear: Tympanic membrane, ear canal and external ear normal.      Left Ear: Tympanic membrane, ear canal and external ear normal.      Nose: Congestion present. Mouth/Throat:      Mouth: Mucous membranes are moist.   Eyes:      Pupils: Pupils are equal, round, and reactive to light. Cardiovascular:      Rate and Rhythm: Normal rate. Musculoskeletal:      Cervical back: Normal range of motion. Neurological:      General: No focal deficit present. Mental Status: She is alert and oriented to person, place, and time. Assessment:      Diagnosis Orders   1. Seasonal allergic rhinitis due to other allergic trigger     2. Post-nasal drainage     3. Nasal congestion               Plan:     Nasal congestion, post nasal drainage and allergic rhinitis   Continue Flonase   Stay hydrated  Follow up in 3 months if no improvement consider CT                  Breanna Avery  1955    I have discussed the case, including pertinent history and exam findings with the resident. I have seen and examined the patient and the key elements of the encounter have been performed by me. I agree with the assessment, plan and orders as documented by the  resident              Remainder of medical problems as per  resident note. Patient seen and examined. Agree with above exam, assessment and plan.       Electronically signed by Mack Albright DO on 5/9/22 at 11:51 AM EDT

## 2022-05-09 ENCOUNTER — HOSPITAL ENCOUNTER (OUTPATIENT)
Dept: ULTRASOUND IMAGING | Age: 67
Discharge: HOME | End: 2022-05-11
Payer: MEDICARE

## 2022-05-09 DIAGNOSIS — I10 HYPERTENSION, UNSPECIFIED TYPE: ICD-10-CM

## 2022-05-09 PROCEDURE — 76770 US EXAM ABDO BACK WALL COMP: CPT

## 2022-05-09 PROCEDURE — 93975 VASCULAR STUDY: CPT

## 2022-05-19 ENCOUNTER — TELEPHONE (OUTPATIENT)
Dept: PRIMARY CARE CLINIC | Age: 67
End: 2022-05-19

## 2022-05-19 NOTE — TELEPHONE ENCOUNTER
Patient given the phone number.  Concerned about a diagnosis of stage 2 chronic kidney disease - spoke to  over the phone about this

## 2022-05-19 NOTE — TELEPHONE ENCOUNTER
She shouldn't need a formal referral for this.   She can try calling Reyes Ordoñez at [phone] to see if he is taking new patients

## 2022-06-15 DIAGNOSIS — R73.01 IMPAIRED FASTING GLUCOSE: ICD-10-CM

## 2022-06-15 DIAGNOSIS — I10 ESSENTIAL HYPERTENSION: ICD-10-CM

## 2022-06-15 DIAGNOSIS — E55.9 VITAMIN D INSUFFICIENCY: ICD-10-CM

## 2022-06-15 LAB
ALBUMIN SERPL-MCNC: 4.3 G/DL (ref 3.5–5.2)
ALP BLD-CCNC: 88 U/L (ref 35–104)
ALT SERPL-CCNC: 14 U/L (ref 0–32)
ANION GAP SERPL CALCULATED.3IONS-SCNC: 16 MMOL/L (ref 7–16)
AST SERPL-CCNC: 18 U/L (ref 0–31)
BACTERIA: ABNORMAL /HPF
BASOPHILS ABSOLUTE: 0.09 E9/L (ref 0–0.2)
BASOPHILS RELATIVE PERCENT: 1.3 % (ref 0–2)
BILIRUB SERPL-MCNC: 0.5 MG/DL (ref 0–1.2)
BILIRUBIN URINE: NEGATIVE
BLOOD, URINE: NEGATIVE
BUN BLDV-MCNC: 12 MG/DL (ref 6–23)
CALCIUM SERPL-MCNC: 9.4 MG/DL (ref 8.6–10.2)
CHLORIDE BLD-SCNC: 104 MMOL/L (ref 98–107)
CHOLESTEROL, TOTAL: 218 MG/DL (ref 0–199)
CLARITY: CLEAR
CO2: 19 MMOL/L (ref 22–29)
COLOR: YELLOW
CREAT SERPL-MCNC: 0.7 MG/DL (ref 0.5–1)
EOSINOPHILS ABSOLUTE: 0.3 E9/L (ref 0.05–0.5)
EOSINOPHILS RELATIVE PERCENT: 4.3 % (ref 0–6)
EPITHELIAL CELLS, UA: ABNORMAL /HPF
GFR AFRICAN AMERICAN: >60
GFR NON-AFRICAN AMERICAN: >60 ML/MIN/1.73
GLUCOSE BLD-MCNC: 97 MG/DL (ref 74–99)
GLUCOSE URINE: NEGATIVE MG/DL
HBA1C MFR BLD: 5.5 % (ref 4–5.6)
HCT VFR BLD CALC: 46.8 % (ref 34–48)
HDLC SERPL-MCNC: 72 MG/DL
HEMOGLOBIN: 14.9 G/DL (ref 11.5–15.5)
IMMATURE GRANULOCYTES #: 0.01 E9/L
IMMATURE GRANULOCYTES %: 0.1 % (ref 0–5)
KETONES, URINE: ABNORMAL MG/DL
LDL CHOLESTEROL CALCULATED: 131 MG/DL (ref 0–99)
LEUKOCYTE ESTERASE, URINE: NEGATIVE
LYMPHOCYTES ABSOLUTE: 2.36 E9/L (ref 1.5–4)
LYMPHOCYTES RELATIVE PERCENT: 34 % (ref 20–42)
MCH RBC QN AUTO: 30.1 PG (ref 26–35)
MCHC RBC AUTO-ENTMCNC: 31.8 % (ref 32–34.5)
MCV RBC AUTO: 94.5 FL (ref 80–99.9)
MONOCYTES ABSOLUTE: 0.93 E9/L (ref 0.1–0.95)
MONOCYTES RELATIVE PERCENT: 13.4 % (ref 2–12)
NEUTROPHILS ABSOLUTE: 3.26 E9/L (ref 1.8–7.3)
NEUTROPHILS RELATIVE PERCENT: 46.9 % (ref 43–80)
NITRITE, URINE: NEGATIVE
PDW BLD-RTO: 13.2 FL (ref 11.5–15)
PH UA: 6 (ref 5–9)
PLATELET # BLD: 362 E9/L (ref 130–450)
PMV BLD AUTO: 10.4 FL (ref 7–12)
POTASSIUM SERPL-SCNC: 4.5 MMOL/L (ref 3.5–5)
PROTEIN UA: ABNORMAL MG/DL
RBC # BLD: 4.95 E12/L (ref 3.5–5.5)
RBC UA: ABNORMAL /HPF (ref 0–2)
SODIUM BLD-SCNC: 139 MMOL/L (ref 132–146)
SPECIFIC GRAVITY UA: 1.02 (ref 1–1.03)
TOTAL PROTEIN: 7.9 G/DL (ref 6.4–8.3)
TRIGL SERPL-MCNC: 76 MG/DL (ref 0–149)
TSH SERPL DL<=0.05 MIU/L-ACNC: 2.41 UIU/ML (ref 0.27–4.2)
UROBILINOGEN, URINE: 0.2 E.U./DL
VITAMIN D 25-HYDROXY: 30 NG/ML (ref 30–100)
VLDLC SERPL CALC-MCNC: 15 MG/DL
WBC # BLD: 7 E9/L (ref 4.5–11.5)
WBC UA: ABNORMAL /HPF (ref 0–5)

## 2022-06-23 ENCOUNTER — OFFICE VISIT (OUTPATIENT)
Dept: PRIMARY CARE CLINIC | Age: 67
End: 2022-06-23
Payer: MEDICARE

## 2022-06-23 VITALS
SYSTOLIC BLOOD PRESSURE: 146 MMHG | WEIGHT: 178 LBS | TEMPERATURE: 97.5 F | HEART RATE: 67 BPM | OXYGEN SATURATION: 98 % | DIASTOLIC BLOOD PRESSURE: 84 MMHG | BODY MASS INDEX: 28.61 KG/M2 | HEIGHT: 66 IN

## 2022-06-23 DIAGNOSIS — R73.01 IMPAIRED FASTING GLUCOSE: ICD-10-CM

## 2022-06-23 DIAGNOSIS — I83.813 VARICOSE VEINS OF BOTH LOWER EXTREMITIES WITH PAIN: ICD-10-CM

## 2022-06-23 DIAGNOSIS — E55.9 VITAMIN D INSUFFICIENCY: ICD-10-CM

## 2022-06-23 DIAGNOSIS — R35.0 URINARY FREQUENCY: ICD-10-CM

## 2022-06-23 DIAGNOSIS — M79.605 BILATERAL LEG PAIN: ICD-10-CM

## 2022-06-23 DIAGNOSIS — I10 ESSENTIAL HYPERTENSION: Primary | ICD-10-CM

## 2022-06-23 DIAGNOSIS — N32.81 OAB (OVERACTIVE BLADDER): ICD-10-CM

## 2022-06-23 DIAGNOSIS — M79.604 BILATERAL LEG PAIN: ICD-10-CM

## 2022-06-23 PROBLEM — H44.20 DEGENERATIVE PROGRESSIVE HIGH MYOPIA: Status: ACTIVE | Noted: 2021-11-17

## 2022-06-23 PROBLEM — H25.819 COMBINED FORM OF SENILE CATARACT: Status: ACTIVE | Noted: 2021-11-17

## 2022-06-23 PROCEDURE — G8427 DOCREV CUR MEDS BY ELIG CLIN: HCPCS | Performed by: FAMILY MEDICINE

## 2022-06-23 PROCEDURE — G8417 CALC BMI ABV UP PARAM F/U: HCPCS | Performed by: FAMILY MEDICINE

## 2022-06-23 PROCEDURE — 3017F COLORECTAL CA SCREEN DOC REV: CPT | Performed by: FAMILY MEDICINE

## 2022-06-23 PROCEDURE — 99214 OFFICE O/P EST MOD 30 MIN: CPT | Performed by: FAMILY MEDICINE

## 2022-06-23 PROCEDURE — G8399 PT W/DXA RESULTS DOCUMENT: HCPCS | Performed by: FAMILY MEDICINE

## 2022-06-23 PROCEDURE — 1036F TOBACCO NON-USER: CPT | Performed by: FAMILY MEDICINE

## 2022-06-23 PROCEDURE — 1123F ACP DISCUSS/DSCN MKR DOCD: CPT | Performed by: FAMILY MEDICINE

## 2022-06-23 PROCEDURE — 1090F PRES/ABSN URINE INCON ASSESS: CPT | Performed by: FAMILY MEDICINE

## 2022-06-23 RX ORDER — OMEPRAZOLE 20 MG/1
20 CAPSULE, DELAYED RELEASE ORAL DAILY PRN
COMMUNITY

## 2022-06-23 ASSESSMENT — ENCOUNTER SYMPTOMS
COUGH: 0
BACK PAIN: 0
WHEEZING: 0
CONSTIPATION: 0
ABDOMINAL PAIN: 0
DIARRHEA: 0
SHORTNESS OF BREATH: 0
VOMITING: 0
NAUSEA: 0

## 2022-06-23 ASSESSMENT — PATIENT HEALTH QUESTIONNAIRE - PHQ9
SUM OF ALL RESPONSES TO PHQ QUESTIONS 1-9: 0
2. FEELING DOWN, DEPRESSED OR HOPELESS: 0
SUM OF ALL RESPONSES TO PHQ9 QUESTIONS 1 & 2: 0
1. LITTLE INTEREST OR PLEASURE IN DOING THINGS: 0
SUM OF ALL RESPONSES TO PHQ QUESTIONS 1-9: 0

## 2022-06-23 NOTE — PROGRESS NOTES
21  [facility] [employer] : 1955 Sex: female  Age: 74 y.o. Chief Complaint   Patient presents with    Discuss Labs    Leg Pain     pt states she has been having a lot of significant leg pain and swelling. more in the left leg    Other     pt has mammogram scheduled for this coming      HPI:  74 y.o. female presents today for ER follow up. Patient's chart, medical, surgical and medication history all reviewed. ER follow up  Date seen in the ER: 21  Symptoms: Elevated blood pressure  Labs/Imaging: CBC, CMP- normal; Troponin- mildly elevated; EKG- sinus bradycardia, incomplete RBBB  Diagnosis: Hypertension   Treatments: Amlodipine and Clonidine    Patient notes that she has not been feeling like herself for some time now. Having occasional lightheadedness, fatigue. Never had HTN prior to this past month. ROS:  Review of Systems   Constitutional: Positive for fatigue. Negative for chills and fever. Respiratory: Negative for cough, shortness of breath and wheezing. Cardiovascular: Negative for chest pain and palpitations. Gastrointestinal: Negative for abdominal pain, constipation, diarrhea, nausea and vomiting. Musculoskeletal: Negative for arthralgias and back pain. Skin: Negative for rash. Neurological: Positive for light-headedness. Negative for dizziness and headaches. Psychiatric/Behavioral: Negative for dysphoric mood. The patient is not nervous/anxious. All other systems reviewed and are negative.      Current Outpatient Medications on File Prior to Visit   Medication Sig Dispense Refill    omeprazole (PRILOSEC) 20 MG delayed release capsule Take 20 mg by mouth daily as needed      fluticasone (FLONASE) 50 MCG/ACT nasal spray 2 sprays by Each Nostril route daily 16 g 5    amLODIPine (NORVASC) 10 MG tablet Take 1 tablet by mouth daily 90 tablet 1    cycloSPORINE (RESTASIS) 0.05 % ophthalmic emulsion 1 drop 2 times daily        No current facility-administered medications on file prior to visit. Allergies   Allergen Reactions    Psudatabs [Pseudoephedrine Hcl]      Racing heart    Shellfish-Derived Products      Scallops digestive problems         Past Medical History:   Diagnosis Date    Hypertension     Visual disturbance     history of stigmatism since childhood     Past Surgical History:   Procedure Laterality Date    APPENDECTOMY      ARM SURGERY Left     cyst    LIPOSUCTION Bilateral     Upper extremity     Family History   Problem Relation Age of Onset    Stroke Mother     Thyroid Disease Mother     Alcohol Abuse Father     Heart Failure Brother         smoked 3 tours in [address] History     Socioeconomic History    Marital status:      Spouse name: Not on file    Number of children: Not on file    Years of education: Not on file    Highest education level: Not on file   Occupational History    Not on file   Tobacco Use    Smoking status: Never Smoker    Smokeless tobacco: Never Used   Vaping Use    Vaping Use: Never used   Substance and Sexual Activity    Alcohol use: Yes     Comment: occasional wine    Drug use: No    Sexual activity: Not on file   Other Topics Concern    Not on file   Social History Narrative    Not on file     Social Determinants of Health     Financial Resource Strain: Low Risk     Difficulty of Paying Living Expenses: Not hard at all   Food Insecurity: No Food Insecurity    Worried About Running Out of Food in the Last Year: Never true    Orestes of Food in the Last Year: Never true   Transportation Needs:     Lack of Transportation (Medical): Not on file    Lack of Transportation (Non-Medical):  Not on file   Physical Activity:     Days of Exercise per Week: Not on file    Minutes of Exercise per Session: Not on file   Stress:     Feeling of Stress : Not on file   Social Connections:     Frequency of Communication with Friends and Family: Not on file    Frequency of Social Gatherings with Friends and Family: Not on file    Attends Church Services: Not on file    Active Member of Clubs or Organizations: Not on file    Attends Club or Organization Meetings: Not on file    Marital Status: Not on file   Intimate Partner Violence:     Fear of Current or Ex-Partner: Not on file    Emotionally Abused: Not on file    Physically Abused: Not on file    Sexually Abused: Not on file   Housing Stability:     Unable to Pay for Housing in the Last Year: Not on file    Number of [place] in the Last Year: Not on file    Unstable Housing in the Last Year: Not on file       Vitals:    06/23/22 1621   BP: (!) 154/88   Pulse: 67   Temp: 97.5 °F (36.4 °C)   SpO2: 98%   Weight: 178 lb (80.7 kg)   Height: 5' 6\" (1.676 m)       Physical Exam:  Physical Exam  Vitals and nursing note reviewed. Constitutional:       General: She is not in acute distress. Appearance: Normal appearance. She is well-developed and normal weight. She is not ill-appearing. HENT:      Head: Normocephalic and atraumatic. Right Ear: Hearing and external ear normal.      Left Ear: Hearing and external ear normal.      Nose:      Comments: Wearing mask  Eyes:      General: Lids are normal. No scleral icterus. Extraocular Movements: Extraocular movements intact. Conjunctiva/sclera: Conjunctivae normal.   Neck:      Thyroid: No thyromegaly. Vascular: No carotid bruit. Cardiovascular:      Rate and Rhythm: Normal rate and regular rhythm. Heart sounds: Normal heart sounds. No murmur heard. Pulmonary:      Effort: Pulmonary effort is normal. No respiratory distress. Breath sounds: Normal breath sounds. No wheezing. Musculoskeletal:         General: No tenderness or deformity. Normal range of motion. Cervical back: Normal range of motion and neck supple. Right lower leg: No edema. Left lower leg: No edema.    Lymphadenopathy:      Cervical: No cervical adenopathy. Skin:     General: Skin is warm and dry. Findings: No rash. Neurological:      General: No focal deficit present. Mental Status: She is alert and oriented to person, place, and time. Gait: Gait normal.   Psychiatric:         Mood and Affect: Mood and affect normal.         Speech: Speech normal.         Behavior: Behavior normal.         Thought Content:  Thought content normal.         Labs:  CBC with Differential:    Lab Results   Component Value Date    WBC 7.0 06/15/2022    RBC 4.95 06/15/2022    HGB 14.9 06/15/2022    HCT 46.8 06/15/2022     06/15/2022    MCV 94.5 06/15/2022    MCH 30.1 06/15/2022    MCHC 31.8 06/15/2022    RDW 13.2 06/15/2022    LYMPHOPCT 34.0 06/15/2022    MONOPCT 13.4 06/15/2022    BASOPCT 1.3 06/15/2022    MONOSABS 0.93 06/15/2022    LYMPHSABS 2.36 06/15/2022    EOSABS 0.30 06/15/2022    BASOSABS 0.09 06/15/2022     CMP:    Lab Results   Component Value Date     06/15/2022    K 4.5 06/15/2022     06/15/2022    CO2 19 06/15/2022    BUN 12 06/15/2022    CREATININE 0.7 06/15/2022    GFRAA >60 06/15/2022    LABGLOM >60 06/15/2022    GLUCOSE 97 06/15/2022    PROT 7.9 06/15/2022    LABALBU 4.3 06/15/2022    CALCIUM 9.4 06/15/2022    BILITOT 0.5 06/15/2022    ALKPHOS 88 06/15/2022    AST 18 06/15/2022    ALT 14 06/15/2022     Troponin:  No results found for: TROPONINI  U/A:    Lab Results   Component Value Date    NITRITE negative 08/09/2021    COLORU Yellow 06/15/2022    PROTEINU TRACE 06/15/2022    PHUR 6.0 06/15/2022    WBCUA NONE 06/15/2022    RBCUA NONE 06/15/2022    BACTERIA RARE 06/15/2022    CLARITYU Clear 06/15/2022    SPECGRAV 1.020 06/15/2022    LEUKOCYTESUR Negative 06/15/2022    UROBILINOGEN 0.2 06/15/2022    BILIRUBINUR Negative 06/15/2022    BILIRUBINUR negative 08/09/2021    BLOODU Negative 06/15/2022    GLUCOSEU Negative 06/15/2022    AMORPHOUS FEW 10/28/2016     HgBA1c:    Lab Results   Component Value Date    LABA1C 5.5 06/15/2022 FLP:    Lab Results   Component Value Date    TRIG 76 06/15/2022    HDL 72 06/15/2022    LDLCALC 131 06/15/2022    LABVLDL 15 06/15/2022     TSH:    Lab Results   Component Value Date    TSH 2.410 06/15/2022        Assessment and Plan:  There are no diagnoses linked to this encounter. BP well controlled in the office today, but patient monitoring at home and she continues to have spikes into the 524-463Z systolic. Having episodes of lightheadedness and fatigue. Abnormal EKG in the ER with mild elevated troponin. Will check Echo and stress test to rule out CAD as cause for sudden elevated BP. No follow-ups on file.       Seen By:  Daryl Harper, DO

## 2022-06-25 ASSESSMENT — ENCOUNTER SYMPTOMS
WHEEZING: 0
BACK PAIN: 0
SHORTNESS OF BREATH: 0
COUGH: 0
NAUSEA: 0
CONSTIPATION: 0
ABDOMINAL PAIN: 1
DIARRHEA: 0
VOMITING: 0

## 2022-06-26 LAB — URINE CULTURE, ROUTINE: NORMAL

## 2022-06-26 NOTE — PROGRESS NOTES
22  Conor Connor : 1955 Sex: female  Age: 74 y.o. Chief Complaint   Patient presents with    Discuss Labs    Leg Pain     pt states she has been having a lot of significant leg pain and swelling. more in the left leg    Other     pt has mammogram scheduled for this coming      HPI:  74 y.o. female presents today for overdue 6 month follow up of chronic medical conditions and FBW. Patient's chart, medical, surgical and medication history all reviewed. Hypertension   The patient presents today for follow up of HTN. The problem is borderline controlled. Risk factors for coronary artery disease include Age > 30 and HTN. Current treatments include amlodipine (Norvasc). The patient is compliant all of the time. Lifestyle changes the patient has made include dietary improvement(s), improved knowledge of chronic condition(s) and weight management. Today the patient is complaining of none. She has been seen by Cardiology due to various symptoms. Mild LV thickening on Echo. Otherwise normal cardiac work up. She was also seen by Dr. Dayron Mckeon. Normal renal US, normal ambulatory BP monitor. Recommended continuation of Amlodipine for now. Patient also complains of bilateral leg achiness. Constant issue. History of varicose veins with previous treatment to R leg. R leg now swells more than L. Leg pain does not improve with rest.  No significant individual joint pain. Noting issues with OAB and urinary frequency. Feels like she has constant pressure in lower pelvis. ROS:  Review of Systems   Constitutional: Negative for chills, fatigue and fever. Respiratory: Negative for cough, shortness of breath and wheezing. Cardiovascular: Positive for leg swelling. Negative for chest pain and palpitations. Gastrointestinal: Positive for abdominal pain. Negative for constipation, diarrhea, nausea and vomiting. Genitourinary: Positive for frequency. Musculoskeletal: Positive for arthralgias. Negative for back pain. Skin: Negative for rash. Neurological: Negative for dizziness, light-headedness and headaches. Psychiatric/Behavioral: Negative for dysphoric mood. The patient is not nervous/anxious. All other systems reviewed and are negative. Current Outpatient Medications on File Prior to Visit   Medication Sig Dispense Refill    omeprazole (PRILOSEC) 20 MG delayed release capsule Take 20 mg by mouth daily as needed      fluticasone (FLONASE) 50 MCG/ACT nasal spray 2 sprays by Each Nostril route daily 16 g 5    amLODIPine (NORVASC) 10 MG tablet Take 1 tablet by mouth daily 90 tablet 1    cycloSPORINE (RESTASIS) 0.05 % ophthalmic emulsion 1 drop 2 times daily        No current facility-administered medications on file prior to visit.        Allergies   Allergen Reactions    Psudatabs [Pseudoephedrine Hcl]      Racing heart    Shellfish-Derived Products      Scallops digestive problems         Past Medical History:   Diagnosis Date    Hypertension     Visual disturbance     history of stigmatism since childhood     Past Surgical History:   Procedure Laterality Date    APPENDECTOMY      ARM SURGERY Left     cyst    LIPOSUCTION Bilateral     Upper extremity     Family History   Problem Relation Age of Onset    Stroke Mother     Thyroid Disease Mother     Alcohol Abuse Father     Heart Failure Brother         smoked 3 tours in [address] History     Socioeconomic History    Marital status:      Spouse name: Not on file    Number of children: Not on file    Years of education: Not on file    Highest education level: Not on file   Occupational History    Not on file   Tobacco Use    Smoking status: Never Smoker    Smokeless tobacco: Never Used   Vaping Use    Vaping Use: Never used   Substance and Sexual Activity    Alcohol use: Yes     Comment: occasional wine    Drug use: No    Sexual activity: Not on file Other Topics Concern    Not on file   Social History Narrative    Not on file     Social Determinants of Health     Financial Resource Strain: Low Risk     Difficulty of Paying Living Expenses: Not hard at all   Food Insecurity: No Food Insecurity    Worried About Running Out of Food in the Last Year: Never true    [address] in the Last Year: Never true   Transportation Needs:     Lack of Transportation (Medical): Not on file    Lack of Transportation (Non-Medical): Not on file   Physical Activity:     Days of Exercise per Week: Not on file    Minutes of Exercise per Session: Not on file   Stress:     Feeling of Stress : Not on file   Social Connections:     Frequency of Communication with Friends and Family: Not on file    Frequency of Social Gatherings with Friends and Family: Not on file    Attends Gnosticist Services: Not on file    Active Member of [address] [place] [address] [employer] or Organizations: Not on file    Attends Club or Organization Meetings: Not on file    Marital Status: Not on file   Intimate Partner Violence:     Fear of Current or Ex-Partner: Not on file    Emotionally Abused: Not on file    Physically Abused: Not on file    Sexually Abused: Not on file   Housing Stability:     Unable to Pay for Housing in the Last Year: Not on file    Number of [place] in the Last Year: Not on file    Unstable Housing in the Last Year: Not on file       Vitals:    06/23/22 1621 06/23/22 1647   BP: (!) 154/88 (!) 146/84   Pulse: 67    Temp: 97.5 °F (36.4 °C)    SpO2: 98%    Weight: 178 lb (80.7 kg)    Height: 5' 6\" (1.676 m)        Physical Exam:  Physical Exam  Vitals and nursing note reviewed. Constitutional:       General: She is not in acute distress. Appearance: Normal appearance. She is well-developed and normal weight. She is not ill-appearing. HENT:      Head: Normocephalic and atraumatic.       Right Ear: Hearing and external ear normal.      Left Ear: Hearing and external ear normal. 06/15/2022    LABALBU 4.3 06/15/2022    CALCIUM 9.4 06/15/2022    BILITOT 0.5 06/15/2022    ALKPHOS 88 06/15/2022    AST 18 06/15/2022    ALT 14 06/15/2022     U/A:    Lab Results   Component Value Date    NITRITE negative 08/09/2021    COLORU Yellow 06/15/2022    PROTEINU TRACE 06/15/2022    PHUR 6.0 06/15/2022    WBCUA NONE 06/15/2022    RBCUA NONE 06/15/2022    BACTERIA RARE 06/15/2022    CLARITYU Clear 06/15/2022    SPECGRAV 1.020 06/15/2022    LEUKOCYTESUR Negative 06/15/2022    UROBILINOGEN 0.2 06/15/2022    BILIRUBINUR Negative 06/15/2022    BILIRUBINUR negative 08/09/2021    BLOODU Negative 06/15/2022    GLUCOSEU Negative 06/15/2022    AMORPHOUS FEW 10/28/2016     HgBA1c:    Lab Results   Component Value Date    LABA1C 5.5 06/15/2022     FLP:    Lab Results   Component Value Date    TRIG 76 06/15/2022    HDL 72 06/15/2022    LDLCALC 131 06/15/2022    LABVLDL 15 06/15/2022     TSH:    Lab Results   Component Value Date    TSH 2.410 06/15/2022        Assessment and Plan:  Nanette Ralph was seen today for discuss labs, leg pain and other. Diagnoses and all orders for this visit:    Essential hypertension  -     CBC with Auto Differential; Future  -     Comprehensive Metabolic Panel; Future  -     Lipid Panel; Future  -     TSH; Future  -     Urinalysis; Future  Borderline control today. Improved on recheck, but still elevated. Not much swelling noted today on exam.  Discussed fluid hydration and salt reduction. Labs reviewed in detail. Repeat in 6 months. Varicose veins of both lower extremities with pain  -     Larissa Marie MD, Vascular Surgery, [place] [facility]  Discussed compression socks. Varicosities are not severe. Patient would like to see vascular. Bilateral leg pain  -     XR PELVIS (1-2 VIEWS); Future  Check xr pelvis to make sure leg pains are not related to hip OA. Urinary frequency  -     Culture, Urine; Future  -     Urinalysis; Future  Likely more OAB than infection. Discussed bowel regularity that can cause some urinary symptoms    OAB (overactive bladder)  -     Urinalysis; Future    Vitamin D insufficiency  -     Vitamin D 25 Hydroxy; Future    Impaired fasting glucose  -     Hemoglobin A1C; Future        Return in about 6 months (around 12/23/2022), or if symptoms worsen or fail to improve, for AWV.       Seen By:  Juan Duffy, DO

## 2022-10-06 ENCOUNTER — TELEPHONE (OUTPATIENT)
Dept: VASCULAR SURGERY | Age: 67
End: 2022-10-06

## 2022-11-28 ENCOUNTER — OFFICE VISIT (OUTPATIENT)
Dept: VASCULAR SURGERY | Age: 67
End: 2022-11-28
Payer: MEDICARE

## 2022-11-28 VITALS — HEIGHT: 66 IN | WEIGHT: 170 LBS | BODY MASS INDEX: 27.32 KG/M2

## 2022-11-28 DIAGNOSIS — I83.813 VARICOSE VEINS OF BOTH LOWER EXTREMITIES WITH PAIN: Primary | ICD-10-CM

## 2022-11-28 PROCEDURE — 1123F ACP DISCUSS/DSCN MKR DOCD: CPT

## 2022-11-28 PROCEDURE — 1036F TOBACCO NON-USER: CPT

## 2022-11-28 PROCEDURE — 1090F PRES/ABSN URINE INCON ASSESS: CPT

## 2022-11-28 PROCEDURE — 3017F COLORECTAL CA SCREEN DOC REV: CPT

## 2022-11-28 PROCEDURE — G8399 PT W/DXA RESULTS DOCUMENT: HCPCS

## 2022-11-28 PROCEDURE — G8484 FLU IMMUNIZE NO ADMIN: HCPCS

## 2022-11-28 PROCEDURE — 99203 OFFICE O/P NEW LOW 30 MIN: CPT

## 2022-11-28 PROCEDURE — G8417 CALC BMI ABV UP PARAM F/U: HCPCS

## 2022-11-28 PROCEDURE — G8427 DOCREV CUR MEDS BY ELIG CLIN: HCPCS

## 2022-11-28 NOTE — PROGRESS NOTES
Vascular Surgery Outpatient Consultation    Reason for Consult:  Varicose veins    PCP : Kelley Silva DO  Podiatrist :     HISTORY OF PRESENT ILLNESS:    The patient is a 69 y.o. female who presents in regards to varicose veins. The symptoms are bilateral.  Symptoms include aching typically worse as on feet more, at end of the day. Pain at its worst is a 8/10. The varicose veins seems to be related to venous reflux. Pt has had issues with leg pain since she was young. About 15 years ago she went to a venous center and had vascular intervention done to her right leg. She was issued prescription compression stockings at that time. She wore them for a short period of time and then stopped. Since that time she has had what she describes as a \"toothache\" feeling in her legs. The achyness starts in her hips and goes down both legs. She denies any hip or back issues. In 9/2022 she had liposuction done to bilateral knees. She started wearing compression stockings after having this done. She is currently using the stockings she got 15 years ago. She does not have a hx of DVT in the past.    She was recently started on amlodipine for blood pressure. She has noticed her left ankle swelling since starting this medication. She did not wear her compression over the Thanksgiving holiday and had increased pain in the bilateral lower extremities.      ROS : All others Negative if blank [], Positive if [x]  General Urinary   [] Fevers [] Hematuria   [] Chills [] Dysuria   [] Weight Loss Vascular   Skin [] Claudication   [] Tissue Loss [] Rest Pain   Eyes Neurologic   [] Wears Glasses/Contacts [] Stroke/TIA   [] Vision Changes [] Focal weakness   Respiratory [] Slurred Speech    [] Shortness of breath ENT   Cardiovascular [] Difficulty swallowing   [] Chest Pain Endocrine    [] Shortness of breath with exertion [] Increased Thirst   Gastrointestinal    [] Abdominal Pain    [] Melena   [] Hematochezia Past Medical History:        Diagnosis Date    Hypertension     Visual disturbance     history of stigmatism since childhood     Past Surgical History:        Procedure Laterality Date    APPENDECTOMY      ARM SURGERY Left     cyst    LIPOSUCTION Bilateral     Upper extremity     Current Medications:   Current Outpatient Medications   Medication Sig Dispense Refill    omeprazole (PRILOSEC) 20 MG delayed release capsule Take 20 mg by mouth daily as needed      fluticasone (FLONASE) 50 MCG/ACT nasal spray 2 sprays by Each Nostril route daily 16 g 5    amLODIPine (NORVASC) 10 MG tablet Take 1 tablet by mouth daily 90 tablet 1    cycloSPORINE (RESTASIS) 0.05 % ophthalmic emulsion 1 drop 2 times daily        No current facility-administered medications for this visit.      Allergies:  Psudatabs [pseudoephedrine hcl] and Shellfish-derived products  Social History     Socioeconomic History    Marital status:      Spouse name: Not on file    Number of children: Not on file    Years of education: Not on file    Highest education level: Not on file   Occupational History    Not on file   Tobacco Use    Smoking status: Never    Smokeless tobacco: Never   Vaping Use    Vaping Use: Never used   Substance and Sexual Activity    Alcohol use: Yes     Comment: occasional wine    Drug use: No    Sexual activity: Not on file   Other Topics Concern    Not on file   Social History Narrative    Not on file     Social Determinants of Health     Financial Resource Strain: Not on file   Food Insecurity: Not on file   Transportation Needs: Not on file   Physical Activity: Not on file   Stress: Not on file   Social Connections: Not on file   Intimate Partner Violence: Not on file   Housing Stability: Not on file        Family History   Problem Relation Age of Onset    Stroke Mother     Thyroid Disease Mother     Alcohol Abuse Father     Heart Failure Brother         smoked 3 tours in [facility]     - negative for DVT   - positive for varicose Veins in mother  Labs  Lab Results   Component Value Date    WBC 7.0 06/15/2022    HGB 14.9 06/15/2022    HCT 46.8 06/15/2022     06/15/2022    PROTIME 13.0 (H) 10/28/2016    INR 1.2 10/28/2016    APTT 26.6 10/28/2016    K 4.5 06/15/2022    BUN 12 06/15/2022    CREATININE 0.7 06/15/2022     PHYSICAL EXAM  There were no vitals taken for this visit.   CONSTITUTIONAL:   Awake, alert, cooperative  PSYCHIATRIC :  Oriented to time, place and person     Appropriate insight to disease process  EYES: Lids and lashes normal  ENT:  External ears and nose without lesions   Hearing deficits not noted  NECK: Supple, symmetrical, trachea midline   Thyroid goiter not appreciated  LUNGS:  No increased work of breathing                 Clear to auscultation  CARDIOVASCULAR:  regular rate and rhythm   ABDOMEN:  soft, non-distended, non-tender   Hernias not noted   Aorta is not palpable  Lymphatics : Cervical lymphadenopathy not noted     Femoral lymphadenopathy not noted  SKIN:   Normal skin color   Texture and turgor normal, no induration  EXTREMITIES:   R UE 5/5 strength   No cyanosis noted in nail beds  L UE 5/5 strength   No cyanosis noted in nail beds  R LE Edema absent   Varicose veins present   L LE Edema absent   Varicose veins present   R femoral  L femoral    R dorsalis pedis 2+ L dorsalis pedis 2+   R posterior tibial 1+ L posterior tibial 1+     RADIOLOGY:    A/PSymptomatic Varicose Veins of Bilateral LE   Etiology is likely associated with venous reflux  I explained to them the pathophysiology of venous reflux and the symptoms associated with it including swelling, pain, edema, heaviness, and even ulceration  Elevate Bilateral LE while in bed or sitting  Compression stockings thigh high 20-30 mm hg wear daily - Script given  Discussed how this is the first line of therapy  They understand even if surgical intervention was felt to be appropriate in the future they would need to wear compression stockings lifetime  F/U as outpatient in 2-3 months  Call if patient develops worsening of swelling or wounds  All ?s answered    Pt seen and plan reviewed with Dr. Les Leventhal, APRN - CNP

## 2022-12-08 DIAGNOSIS — I10 ESSENTIAL HYPERTENSION: ICD-10-CM

## 2022-12-08 RX ORDER — AMLODIPINE BESYLATE 10 MG/1
10 TABLET ORAL DAILY
Qty: 90 TABLET | Refills: 1 | Status: SHIPPED | OUTPATIENT
Start: 2022-12-08

## 2023-01-10 ENCOUNTER — OFFICE VISIT (OUTPATIENT)
Dept: FAMILY MEDICINE CLINIC | Age: 68
End: 2023-01-10
Payer: MEDICARE

## 2023-01-10 ENCOUNTER — TELEPHONE (OUTPATIENT)
Dept: PRIMARY CARE CLINIC | Age: 68
End: 2023-01-10

## 2023-01-10 VITALS
RESPIRATION RATE: 20 BRPM | TEMPERATURE: 97.1 F | WEIGHT: 182 LBS | OXYGEN SATURATION: 96 % | BODY MASS INDEX: 29.25 KG/M2 | DIASTOLIC BLOOD PRESSURE: 82 MMHG | HEIGHT: 66 IN | SYSTOLIC BLOOD PRESSURE: 138 MMHG | HEART RATE: 84 BPM

## 2023-01-10 DIAGNOSIS — M54.2 NECK PAIN: Primary | ICD-10-CM

## 2023-01-10 PROCEDURE — G8417 CALC BMI ABV UP PARAM F/U: HCPCS

## 2023-01-10 PROCEDURE — 3079F DIAST BP 80-89 MM HG: CPT

## 2023-01-10 PROCEDURE — 1123F ACP DISCUSS/DSCN MKR DOCD: CPT

## 2023-01-10 PROCEDURE — G8484 FLU IMMUNIZE NO ADMIN: HCPCS

## 2023-01-10 PROCEDURE — 99213 OFFICE O/P EST LOW 20 MIN: CPT

## 2023-01-10 PROCEDURE — G8427 DOCREV CUR MEDS BY ELIG CLIN: HCPCS

## 2023-01-10 PROCEDURE — 3017F COLORECTAL CA SCREEN DOC REV: CPT

## 2023-01-10 PROCEDURE — 1036F TOBACCO NON-USER: CPT

## 2023-01-10 PROCEDURE — G8399 PT W/DXA RESULTS DOCUMENT: HCPCS

## 2023-01-10 PROCEDURE — 1090F PRES/ABSN URINE INCON ASSESS: CPT

## 2023-01-10 PROCEDURE — 3075F SYST BP GE 130 - 139MM HG: CPT

## 2023-01-10 ASSESSMENT — ENCOUNTER SYMPTOMS
APNEA: 0
VOMITING: 0
ABDOMINAL PAIN: 0
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
## 2023-01-17 ENCOUNTER — OFFICE VISIT (OUTPATIENT)
Dept: CHIROPRACTIC MEDICINE | Age: 68
End: 2023-01-17
Payer: MEDICARE

## 2023-01-17 VITALS
BODY MASS INDEX: 29.25 KG/M2 | TEMPERATURE: 97.1 F | RESPIRATION RATE: 16 BRPM | DIASTOLIC BLOOD PRESSURE: 76 MMHG | WEIGHT: 182 LBS | HEIGHT: 66 IN | HEART RATE: 81 BPM | OXYGEN SATURATION: 96 % | SYSTOLIC BLOOD PRESSURE: 142 MMHG

## 2023-01-17 DIAGNOSIS — M47.812 CERVICAL SPONDYLOSIS: ICD-10-CM

## 2023-01-17 DIAGNOSIS — M99.02 SEGMENTAL AND SOMATIC DYSFUNCTION OF THORACIC REGION: ICD-10-CM

## 2023-01-17 DIAGNOSIS — M54.04 PANNICULITIS AFFECTING REGIONS OF NECK AND BACK, THORACIC REGION: ICD-10-CM

## 2023-01-17 DIAGNOSIS — M99.01 SEGMENTAL AND SOMATIC DYSFUNCTION OF CERVICAL REGION: Primary | ICD-10-CM

## 2023-01-17 PROCEDURE — G8484 FLU IMMUNIZE NO ADMIN: HCPCS | Performed by: CHIROPRACTOR

## 2023-01-17 PROCEDURE — 1090F PRES/ABSN URINE INCON ASSESS: CPT | Performed by: CHIROPRACTOR

## 2023-01-17 PROCEDURE — G8428 CUR MEDS NOT DOCUMENT: HCPCS | Performed by: CHIROPRACTOR

## 2023-01-17 PROCEDURE — 3017F COLORECTAL CA SCREEN DOC REV: CPT | Performed by: CHIROPRACTOR

## 2023-01-17 PROCEDURE — 1123F ACP DISCUSS/DSCN MKR DOCD: CPT | Performed by: CHIROPRACTOR

## 2023-01-17 PROCEDURE — 3077F SYST BP >= 140 MM HG: CPT | Performed by: CHIROPRACTOR

## 2023-01-17 PROCEDURE — 1036F TOBACCO NON-USER: CPT | Performed by: CHIROPRACTOR

## 2023-01-17 PROCEDURE — 99203 OFFICE O/P NEW LOW 30 MIN: CPT | Performed by: CHIROPRACTOR

## 2023-01-17 PROCEDURE — G8417 CALC BMI ABV UP PARAM F/U: HCPCS | Performed by: CHIROPRACTOR

## 2023-01-17 PROCEDURE — G8399 PT W/DXA RESULTS DOCUMENT: HCPCS | Performed by: CHIROPRACTOR

## 2023-01-17 PROCEDURE — 98940 CHIROPRACT MANJ 1-2 REGIONS: CPT | Performed by: CHIROPRACTOR

## 2023-01-17 PROCEDURE — 3078F DIAST BP <80 MM HG: CPT | Performed by: CHIROPRACTOR

## 2023-01-17 NOTE — PATIENT INSTRUCTIONS
Heat as needed 10-20 mins    Range of Motion -- shoulder rolls x 5  neck 2x  Chin retraction 5-10  Do ROM 2 x daily
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
## 2023-01-27 ENCOUNTER — TELEPHONE (OUTPATIENT)
Dept: VASCULAR SURGERY | Age: 68
End: 2023-01-27

## 2023-02-10 ENCOUNTER — TELEPHONE (OUTPATIENT)
Dept: PRIMARY CARE CLINIC | Age: 68
End: 2023-02-10

## 2023-02-10 RX ORDER — PREDNISONE 10 MG/1
TABLET ORAL
Qty: 30 TABLET | Refills: 0 | Status: SHIPPED | OUTPATIENT
Start: 2023-02-10 | End: 2023-02-22

## 2023-02-10 NOTE — TELEPHONE ENCOUNTER
I can certainly send Prednisone if she is OK taking it. Unfortunately COVID can cause fevers for 5-7 days.   Managing the symptoms is usually all we recommend

## 2023-02-10 NOTE — TELEPHONE ENCOUNTER
Patient was seen in [facility] yesterday and tested + for Covid. Started with a fever on Tuesday. Will go up to 103.6 and then 99 with Tylenol. Still having the fever- she's only able to manage the fever, it will not go away. Now has dry cough and the L side of her head from ear to neck is hurting. Using 8 Hour Relief Tylenol that she ok'd with [facility]. Asking if there is anything else you can prescribe for her. [facility] advised patient that maybe she needs either an ABX or steroid. Does not want these symptoms to get worse over the weekend.       Mery Roth

## 2023-02-13 ENCOUNTER — TELEPHONE (OUTPATIENT)
Dept: PRIMARY CARE CLINIC | Age: 68
End: 2023-02-13

## 2023-02-13 NOTE — TELEPHONE ENCOUNTER
----- Message from Marcoclifton Shyam sent at 2/13/2023  9:28 AM EST -----  Subject: Message to Provider    QUESTIONS  Information for Provider? pt. is requesting a phone call regarding covid   19 question pt. son in law is very ill pt. is asking if her  can go   be around him with pt. exposing him to [place] 19 Please, call pt. to advise   asap because they need to go to [facility] to be with daughter   ---------------------------------------------------------------------------  --------------  [id number] [employer]  [phone]; OK to leave message on voicemail  ---------------------------------------------------------------------------  --------------  SCRIPT ANSWERS  Relationship to Patient?  Self

## 2023-02-13 NOTE — TELEPHONE ENCOUNTER
Called and spoke with pt - she tested positive for covid on 02/09. Has been fever free since friday night. They need to go to [place] because son in law is extremely sick and in hospital and daughter is newly pregnant.  Advised pt 5 days from her symptoms and  will test before he goes

## 2023-02-14 ENCOUNTER — TELEPHONE (OUTPATIENT)
Dept: PRIMARY CARE CLINIC | Age: 68
End: 2023-02-14

## 2023-02-14 NOTE — TELEPHONE ENCOUNTER
Diagnosed with Covid last week and was given Prednisone. Not much better. Says symptoms started a week ago. Has been seeing Paxlovid on TV and was questioning that. Was hoping to speak with you personally or Jeimy Etienne personally regarding her Covid.

## 2023-02-14 NOTE — TELEPHONE ENCOUNTER
She is outside of the treatment window for Paxlovid at this time. COVID can take time to improve.   If she is short of breath or having chest pains, she needs to be seen

## 2023-02-27 DIAGNOSIS — I10 ESSENTIAL HYPERTENSION: ICD-10-CM

## 2023-02-27 RX ORDER — AMLODIPINE BESYLATE 10 MG/1
10 TABLET ORAL DAILY
Qty: 90 TABLET | Refills: 1 | Status: SHIPPED | OUTPATIENT
Start: 2023-02-27

## 2023-04-04 ENCOUNTER — OFFICE VISIT (OUTPATIENT)
Dept: PRIMARY CARE CLINIC | Age: 68
End: 2023-04-04

## 2023-04-04 VITALS
WEIGHT: 181.25 LBS | OXYGEN SATURATION: 97 % | HEIGHT: 66 IN | TEMPERATURE: 97.9 F | HEART RATE: 71 BPM | DIASTOLIC BLOOD PRESSURE: 66 MMHG | BODY MASS INDEX: 29.13 KG/M2 | SYSTOLIC BLOOD PRESSURE: 138 MMHG

## 2023-04-04 DIAGNOSIS — R73.01 IMPAIRED FASTING GLUCOSE: ICD-10-CM

## 2023-04-04 DIAGNOSIS — G47.10 HYPERSOMNOLENCE: ICD-10-CM

## 2023-04-04 DIAGNOSIS — Z78.0 POSTMENOPAUSAL: ICD-10-CM

## 2023-04-04 DIAGNOSIS — Z00.00 MEDICARE ANNUAL WELLNESS VISIT, SUBSEQUENT: Primary | ICD-10-CM

## 2023-04-04 DIAGNOSIS — E55.9 VITAMIN D INSUFFICIENCY: ICD-10-CM

## 2023-04-04 DIAGNOSIS — Z12.31 ENCOUNTER FOR SCREENING MAMMOGRAM FOR MALIGNANT NEOPLASM OF BREAST: ICD-10-CM

## 2023-04-04 DIAGNOSIS — I10 ESSENTIAL HYPERTENSION: ICD-10-CM

## 2023-04-04 RX ORDER — CYCLOSPORINE 0 G/ML
SOLUTION/ DROPS OPHTHALMIC; TOPICAL
COMMUNITY

## 2023-04-04 ASSESSMENT — SLEEP AND FATIGUE QUESTIONNAIRES
HOW LIKELY ARE YOU TO NOD OFF OR FALL ASLEEP WHEN YOU ARE A PASSENGER IN A CAR FOR AN HOUR WITHOUT A BREAK: 0
HOW LIKELY ARE YOU TO NOD OFF OR FALL ASLEEP WHILE LYING DOWN TO REST IN THE AFTERNOON WHEN CIRCUMSTANCES PERMIT: 3
HOW LIKELY ARE YOU TO NOD OFF OR FALL ASLEEP WHILE SITTING INACTIVE IN A PUBLIC PLACE: 0
HOW LIKELY ARE YOU TO NOD OFF OR FALL ASLEEP WHILE SITTING AND TALKING TO SOMEONE: 0
HOW LIKELY ARE YOU TO NOD OFF OR FALL ASLEEP WHILE SITTING QUIETLY AFTER LUNCH WITHOUT ALCOHOL: 1
HOW LIKELY ARE YOU TO NOD OFF OR FALL ASLEEP WHILE WATCHING TV: 1
ESS TOTAL SCORE: 5
HOW LIKELY ARE YOU TO NOD OFF OR FALL ASLEEP WHILE SITTING AND READING: 0
NECK CIRCUMFERENCE (INCHES): 12.5
HOW LIKELY ARE YOU TO NOD OFF OR FALL ASLEEP IN A CAR, WHILE STOPPED FOR A FEW MINUTES IN TRAFFIC: 0

## 2023-04-04 ASSESSMENT — PATIENT HEALTH QUESTIONNAIRE - PHQ9
SUM OF ALL RESPONSES TO PHQ QUESTIONS 1-9: 0
SUM OF ALL RESPONSES TO PHQ9 QUESTIONS 1 & 2: 0
SUM OF ALL RESPONSES TO PHQ QUESTIONS 1-9: 0
2. FEELING DOWN, DEPRESSED OR HOPELESS: 0
SUM OF ALL RESPONSES TO PHQ QUESTIONS 1-9: 0
SUM OF ALL RESPONSES TO PHQ QUESTIONS 1-9: 0
1. LITTLE INTEREST OR PLEASURE IN DOING THINGS: 0

## 2023-04-04 ASSESSMENT — LIFESTYLE VARIABLES
HOW OFTEN DO YOU HAVE A DRINK CONTAINING ALCOHOL: 2-3 TIMES A WEEK
HOW MANY STANDARD DRINKS CONTAINING ALCOHOL DO YOU HAVE ON A TYPICAL DAY: 1 OR 2

## 2023-04-04 NOTE — PATIENT INSTRUCTIONS
[place] on Aging online. You need 2891-1468 mg of calcium and 2521-9827 IU of vitamin D per day. It is possible to meet your calcium requirement with diet alone, but a vitamin D supplement is usually necessary to meet this goal.  When exposed to the sun, use a sunscreen that protects against both UVA and UVB radiation with an SPF of 30 or greater. Reapply every 2 to 3 hours or after sweating, drying off with a towel, or swimming. Always wear a seat belt when traveling in a car. Always wear a helmet when riding a bicycle or motorcycle.

## 2023-04-04 NOTE — PROGRESS NOTES
Cognitive: Words recalled: 0 Words Recalled   Clock Drawing Test (CDT): Normal   Total Score: (!) 2   Total Score Interpretation: Abnormal Mini-Cog      Interventions:  Patient declines any further evaluation or treatment                               Objective   Vitals:    04/04/23 1102   BP: 138/66   Pulse: 71   Temp: 97.9 °F (36.6 °C)   TempSrc: Temporal   SpO2: 97%   Weight: 181 lb 4 oz (82.2 kg)   Height: 5' 6\" (1.676 m)      Body mass index is 29.25 kg/m². Physical Exam  Vitals and nursing note reviewed. Constitutional:       General: She is not in acute distress. Appearance: Normal appearance. She is well-developed and normal weight. She is not ill-appearing. HENT:      Head: Normocephalic and atraumatic. Right Ear: Hearing and external ear normal.      Left Ear: Hearing and external ear normal.      Nose: Nose normal.      Mouth/Throat:      Mouth: Mucous membranes are moist.   Eyes:      General: Lids are normal. No scleral icterus. Extraocular Movements: Extraocular movements intact. Conjunctiva/sclera: Conjunctivae normal.   Neck:      Thyroid: No thyromegaly. Vascular: No carotid bruit. Cardiovascular:      Rate and Rhythm: Normal rate and regular rhythm. Heart sounds: Normal heart sounds. No murmur heard. Pulmonary:      Effort: Pulmonary effort is normal. No respiratory distress. Breath sounds: Normal breath sounds. No wheezing. Musculoskeletal:         General: No tenderness or deformity. Normal range of motion. Cervical back: Normal range of motion and neck supple. Right lower leg: No edema. Left lower leg: No edema. Lymphadenopathy:      Cervical: No cervical adenopathy. Skin:     General: Skin is warm and dry. Findings: No rash. Neurological:      General: No focal deficit present. Mental Status: She is alert and oriented to person, place, and time.       Gait: Gait normal.   Psychiatric:         Mood and Affect: Mood

## 2023-05-23 ENCOUNTER — TELEPHONE (OUTPATIENT)
Dept: PRIMARY CARE CLINIC | Age: 68
End: 2023-05-23

## 2023-05-23 NOTE — TELEPHONE ENCOUNTER
I reviewed all of her codes from that date of service. There is not any code for \"telephone\" visit on the list of codes.   She will have to speak with billing regarding this

## 2023-05-23 NOTE — TELEPHONE ENCOUNTER
Pt c/o per her ins her OV on 4/4 was coded as in person and as a virtual. She states she received a copay bill but she should not have a copay and ins advised her it was due to the OV being coded as a \"telephone\" visit

## 2023-06-01 DIAGNOSIS — R73.01 IMPAIRED FASTING GLUCOSE: ICD-10-CM

## 2023-06-01 DIAGNOSIS — E55.9 VITAMIN D INSUFFICIENCY: ICD-10-CM

## 2023-06-01 DIAGNOSIS — I10 ESSENTIAL HYPERTENSION: ICD-10-CM

## 2023-06-01 LAB
ALBUMIN SERPL-MCNC: 4.3 G/DL (ref 3.5–5.2)
ALP SERPL-CCNC: 88 U/L (ref 35–104)
ALT SERPL-CCNC: 20 U/L (ref 0–32)
ANION GAP SERPL CALCULATED.3IONS-SCNC: 12 MMOL/L (ref 7–16)
AST SERPL-CCNC: 22 U/L (ref 0–31)
BASOPHILS # BLD: 0.13 E9/L (ref 0–0.2)
BASOPHILS NFR BLD: 2.1 % (ref 0–2)
BILIRUB SERPL-MCNC: 0.4 MG/DL (ref 0–1.2)
BILIRUB UR QL STRIP: NEGATIVE
BUN SERPL-MCNC: 13 MG/DL (ref 6–23)
CALCIUM SERPL-MCNC: 9.6 MG/DL (ref 8.6–10.2)
CHLORIDE SERPL-SCNC: 103 MMOL/L (ref 98–107)
CHOLESTEROL, TOTAL: 251 MG/DL (ref 0–199)
CLARITY UR: CLEAR
CO2 SERPL-SCNC: 23 MMOL/L (ref 22–29)
COLOR UR: YELLOW
CREAT SERPL-MCNC: 0.7 MG/DL (ref 0.5–1)
EOSINOPHIL # BLD: 0.26 E9/L (ref 0.05–0.5)
EOSINOPHIL NFR BLD: 4.1 % (ref 0–6)
ERYTHROCYTE [DISTWIDTH] IN BLOOD BY AUTOMATED COUNT: 13.5 FL (ref 11.5–15)
GLUCOSE SERPL-MCNC: 93 MG/DL (ref 74–99)
GLUCOSE UR STRIP-MCNC: NEGATIVE MG/DL
HBA1C MFR BLD: 5.6 % (ref 4–5.6)
HCT VFR BLD AUTO: 47.2 % (ref 34–48)
HDLC SERPL-MCNC: 66 MG/DL
HGB BLD-MCNC: 14.7 G/DL (ref 11.5–15.5)
HGB UR QL STRIP: NEGATIVE
IMM GRANULOCYTES # BLD: 0.01 E9/L
IMM GRANULOCYTES NFR BLD: 0.2 % (ref 0–5)
KETONES UR STRIP-MCNC: NEGATIVE MG/DL
LDLC SERPL CALC-MCNC: 165 MG/DL (ref 0–99)
LEUKOCYTE ESTERASE UR QL STRIP: NEGATIVE
LYMPHOCYTES # BLD: 2.66 E9/L (ref 1.5–4)
LYMPHOCYTES NFR BLD: 42 % (ref 20–42)
MCH RBC QN AUTO: 29.6 PG (ref 26–35)
MCHC RBC AUTO-ENTMCNC: 31.1 % (ref 32–34.5)
MCV RBC AUTO: 95 FL (ref 80–99.9)
MONOCYTES # BLD: 1.04 E9/L (ref 0.1–0.95)
MONOCYTES NFR BLD: 16.4 % (ref 2–12)
NEUTROPHILS # BLD: 2.23 E9/L (ref 1.8–7.3)
NEUTS SEG NFR BLD: 35.2 % (ref 43–80)
NITRITE UR QL STRIP: NEGATIVE
PH UR STRIP: 7 [PH] (ref 5–9)
PLATELET # BLD AUTO: 400 E9/L (ref 130–450)
PMV BLD AUTO: 9.9 FL (ref 7–12)
POTASSIUM SERPL-SCNC: 4.6 MMOL/L (ref 3.5–5)
PROT SERPL-MCNC: 7.8 G/DL (ref 6.4–8.3)
PROT UR STRIP-MCNC: NEGATIVE MG/DL
RBC # BLD AUTO: 4.97 E12/L (ref 3.5–5.5)
SODIUM SERPL-SCNC: 138 MMOL/L (ref 132–146)
SP GR UR STRIP: 1.01 (ref 1–1.03)
T4 FREE SERPL-MCNC: 1.32 NG/DL (ref 0.93–1.7)
TRIGL SERPL-MCNC: 102 MG/DL (ref 0–149)
TSH SERPL-MCNC: 2.56 UIU/ML (ref 0.27–4.2)
UROBILINOGEN UR STRIP-ACNC: 0.2 E.U./DL
VITAMIN D 25-HYDROXY: 23 NG/ML (ref 30–100)
VLDLC SERPL CALC-MCNC: 20 MG/DL
WBC # BLD: 6.3 E9/L (ref 4.5–11.5)

## 2023-06-13 DIAGNOSIS — I10 ESSENTIAL HYPERTENSION: ICD-10-CM

## 2023-06-13 RX ORDER — AMLODIPINE BESYLATE 10 MG/1
TABLET ORAL
Qty: 90 TABLET | Refills: 3 | Status: SHIPPED | OUTPATIENT
Start: 2023-06-13

## 2023-06-26 DIAGNOSIS — I10 ESSENTIAL HYPERTENSION: ICD-10-CM

## 2023-06-26 RX ORDER — AMLODIPINE BESYLATE 10 MG/1
10 TABLET ORAL DAILY
Qty: 90 TABLET | Refills: 1 | OUTPATIENT
Start: 2023-06-26

## 2023-06-28 LAB — MAMMOGRAPHY, EXTERNAL: NORMAL

## 2023-07-20 ENCOUNTER — HOSPITAL ENCOUNTER (OUTPATIENT)
Age: 68
Discharge: HOME | End: 2023-07-22

## 2023-07-25 LAB — SURGICAL PATHOLOGY REPORT: NORMAL

## 2023-10-02 ENCOUNTER — TELEPHONE (OUTPATIENT)
Dept: ENT CLINIC | Age: 68
End: 2023-10-02

## 2023-10-02 NOTE — TELEPHONE ENCOUNTER
Pt called in to [facility] an appt, for trouble swallowing, she would like to be seen ASAP. She had a scope done by Dr. Baldomero Krabbe back in March or April, she was advised there was nothing wrong with her esophagus. She will have the results faxed over to the office. Please, advise on scheduling. Thank you. Rosemary Pacheco can be reached at [phone].

## 2023-10-02 NOTE — TELEPHONE ENCOUNTER
Patient scheduled with Dr. Surendra Valadez 10/3 due to cancellation.  LOV with Werner: 05/2022    Electronically signed by Gomez Jansen MA on 10/2/23 at 9:38 AM EDT

## 2023-10-03 ENCOUNTER — OFFICE VISIT (OUTPATIENT)
Dept: ENT CLINIC | Age: 68
End: 2023-10-03
Payer: MEDICARE

## 2023-10-03 VITALS
HEART RATE: 65 BPM | SYSTOLIC BLOOD PRESSURE: 125 MMHG | BODY MASS INDEX: 28.45 KG/M2 | DIASTOLIC BLOOD PRESSURE: 84 MMHG | WEIGHT: 177 LBS | TEMPERATURE: 97.6 F | OXYGEN SATURATION: 94 % | HEIGHT: 66 IN

## 2023-10-03 DIAGNOSIS — K21.9 GASTROESOPHAGEAL REFLUX DISEASE WITHOUT ESOPHAGITIS: Chronic | ICD-10-CM

## 2023-10-03 DIAGNOSIS — R13.10 DYSPHAGIA, UNSPECIFIED TYPE: Primary | ICD-10-CM

## 2023-10-03 PROCEDURE — 1123F ACP DISCUSS/DSCN MKR DOCD: CPT | Performed by: OTOLARYNGOLOGY

## 2023-10-03 PROCEDURE — G8399 PT W/DXA RESULTS DOCUMENT: HCPCS | Performed by: OTOLARYNGOLOGY

## 2023-10-03 PROCEDURE — 1090F PRES/ABSN URINE INCON ASSESS: CPT | Performed by: OTOLARYNGOLOGY

## 2023-10-03 PROCEDURE — 1036F TOBACCO NON-USER: CPT | Performed by: OTOLARYNGOLOGY

## 2023-10-03 PROCEDURE — 3017F COLORECTAL CA SCREEN DOC REV: CPT | Performed by: OTOLARYNGOLOGY

## 2023-10-03 PROCEDURE — G8417 CALC BMI ABV UP PARAM F/U: HCPCS | Performed by: OTOLARYNGOLOGY

## 2023-10-03 PROCEDURE — 99213 OFFICE O/P EST LOW 20 MIN: CPT | Performed by: OTOLARYNGOLOGY

## 2023-10-03 PROCEDURE — G8427 DOCREV CUR MEDS BY ELIG CLIN: HCPCS | Performed by: OTOLARYNGOLOGY

## 2023-10-03 PROCEDURE — 3078F DIAST BP <80 MM HG: CPT | Performed by: OTOLARYNGOLOGY

## 2023-10-03 PROCEDURE — 3074F SYST BP LT 130 MM HG: CPT | Performed by: OTOLARYNGOLOGY

## 2023-10-03 PROCEDURE — G8484 FLU IMMUNIZE NO ADMIN: HCPCS | Performed by: OTOLARYNGOLOGY

## 2023-10-03 ASSESSMENT — ENCOUNTER SYMPTOMS
SINUS PAIN: 0
SINUS PRESSURE: 0
ABDOMINAL DISTENTION: 0
ABDOMINAL PAIN: 0

## 2023-10-03 NOTE — PROGRESS NOTES
Subjective:      Patient ID:  Robyn Garcia is a 77 y.o. female. HPI:    Patient presents today for Dysphagia. Condition has been present for 4 year(s). Had and EGD and colonoscopy by Dr Jaylin Hebert this past spring, both were negative for pathology. Up until recently was taking pepcid for acid reflux. No history of autoimmune disease or esophageal dysmotility. Trouble with solids more than liquids. Was started on flonase and nasal rinse for recurrent sinusitis and reports being well controlled.       Past Medical History:   Diagnosis Date    Hypertension     Leg pain     Leg swelling     Visual disturbance     history of stigmatism since childhood     Past Surgical History:   Procedure Laterality Date    APPENDECTOMY      ARM SURGERY Left     cyst    CATARACT EXTRACTION      COLONOSCOPY      LIPOSUCTION Bilateral     Upper extremity    UPPER GASTROINTESTINAL ENDOSCOPY       Family History   Problem Relation Age of Onset    Stroke Mother     Thyroid Disease Mother     Alcohol Abuse Father     Heart Failure Brother         smoked 3 tours in [address] History     Socioeconomic History    Marital status:      Spouse name: None    Number of children: None    Years of education: None    Highest education level: None   Tobacco Use    Smoking status: Never    Smokeless tobacco: Never   Vaping Use    Vaping Use: Never used   Substance and Sexual Activity    Alcohol use: Yes     Comment: occasional wine    Drug use: No     Social Determinants of Health     Financial Resource Strain: Low Risk  (11/12/2021)    Overall Financial Resource Strain (CARDIA)     Difficulty of Paying Living Expenses: Not hard at all   Food Insecurity: No Food Insecurity (11/12/2021)    Hunger Vital Sign     Worried About Running Out of Food in the Last Year: Never true     Ran Out of Food in the Last Year: Never true   Physical Activity: Insufficiently Active (4/4/2023)    Exercise Vital Sign     Days of Exercise per Week: 7 days

## 2023-10-10 ENCOUNTER — TELEPHONE (OUTPATIENT)
Dept: PRIMARY CARE CLINIC | Age: 68
End: 2023-10-10

## 2023-10-10 DIAGNOSIS — R13.19 ESOPHAGEAL DYSPHAGIA: Primary | ICD-10-CM

## 2023-10-10 NOTE — TELEPHONE ENCOUNTER
She called asking what my recommendation is to figure out what is going on. My recommendation is a swallow test to evaluate her swallowing and esophagus.   She can schedule an appt if she wants to discuss this further- RED ok

## 2023-10-10 NOTE — TELEPHONE ENCOUNTER
Patient said that both doctors ruled out during a swallow study - said it wasn't necessary. Georganna Days it feels like it is getting stuck in her chest. Said something she swallows and then it doesn't feel good and other times she swallows and it will not go down and she had to sit it back up.

## 2023-10-10 NOTE — TELEPHONE ENCOUNTER
For years the pt has had issues with her throat she could swallow but it gets stuck sometimes she had seen Dr Dorian Fiore in the past and her esophagus was stretched she switched to Dr Echo Medina and he told her that nothing is wrong and put her on omeprazole but she was still having the issue so she saw Dr Jim Herrera and he says there is nothing wrong, she is calling because atleast once a week she is choking and is asking what can be done to see what is going on

## 2023-10-10 NOTE — TELEPHONE ENCOUNTER
We can do a swallow study to evaluate the function of her swallowing. Does it feel like it is getting stuck in her chest or back of her throat?

## 2023-10-13 ENCOUNTER — HOSPITAL ENCOUNTER (OUTPATIENT)
Dept: MAMMOGRAPHY | Age: 68
End: 2023-10-13
Payer: MEDICARE

## 2023-10-13 DIAGNOSIS — Z78.0 POSTMENOPAUSAL: ICD-10-CM

## 2023-10-13 PROCEDURE — 77080 DXA BONE DENSITY AXIAL: CPT

## 2023-10-17 ASSESSMENT — ENCOUNTER SYMPTOMS
GASTROINTESTINAL NEGATIVE: 1
EYE PAIN: 0
SHORTNESS OF BREATH: 0
RESPIRATORY NEGATIVE: 1
DIARRHEA: 0
EYES NEGATIVE: 1
VOMITING: 0
APNEA: 0
COLOR CHANGE: 0
CHEST TIGHTNESS: 0
EYE DISCHARGE: 0

## 2023-10-30 ENCOUNTER — OFFICE VISIT (OUTPATIENT)
Dept: FAMILY MEDICINE CLINIC | Age: 68
End: 2023-10-30
Payer: MEDICARE

## 2023-10-30 VITALS
HEART RATE: 68 BPM | BODY MASS INDEX: 29.18 KG/M2 | SYSTOLIC BLOOD PRESSURE: 126 MMHG | TEMPERATURE: 97.8 F | DIASTOLIC BLOOD PRESSURE: 82 MMHG | OXYGEN SATURATION: 97 % | WEIGHT: 180.8 LBS

## 2023-10-30 DIAGNOSIS — R05.9 COUGH, UNSPECIFIED TYPE: ICD-10-CM

## 2023-10-30 DIAGNOSIS — J06.9 ACUTE UPPER RESPIRATORY INFECTION, UNSPECIFIED: Primary | ICD-10-CM

## 2023-10-30 DIAGNOSIS — J01.90 ACUTE NON-RECURRENT SINUSITIS, UNSPECIFIED LOCATION: ICD-10-CM

## 2023-10-30 DIAGNOSIS — R09.81 NASAL CONGESTION: ICD-10-CM

## 2023-10-30 DIAGNOSIS — R09.82 POSTNASAL DRIP: ICD-10-CM

## 2023-10-30 PROCEDURE — 3079F DIAST BP 80-89 MM HG: CPT | Performed by: PHYSICIAN ASSISTANT

## 2023-10-30 PROCEDURE — 3006F CXR DOC REV: CPT | Performed by: PHYSICIAN ASSISTANT

## 2023-10-30 PROCEDURE — 99204 OFFICE O/P NEW MOD 45 MIN: CPT | Performed by: PHYSICIAN ASSISTANT

## 2023-10-30 PROCEDURE — G8399 PT W/DXA RESULTS DOCUMENT: HCPCS | Performed by: PHYSICIAN ASSISTANT

## 2023-10-30 PROCEDURE — G8427 DOCREV CUR MEDS BY ELIG CLIN: HCPCS | Performed by: PHYSICIAN ASSISTANT

## 2023-10-30 PROCEDURE — 3017F COLORECTAL CA SCREEN DOC REV: CPT | Performed by: PHYSICIAN ASSISTANT

## 2023-10-30 PROCEDURE — 1123F ACP DISCUSS/DSCN MKR DOCD: CPT | Performed by: PHYSICIAN ASSISTANT

## 2023-10-30 PROCEDURE — G8484 FLU IMMUNIZE NO ADMIN: HCPCS | Performed by: PHYSICIAN ASSISTANT

## 2023-10-30 PROCEDURE — 3074F SYST BP LT 130 MM HG: CPT | Performed by: PHYSICIAN ASSISTANT

## 2023-10-30 PROCEDURE — G8417 CALC BMI ABV UP PARAM F/U: HCPCS | Performed by: PHYSICIAN ASSISTANT

## 2023-10-30 PROCEDURE — 1036F TOBACCO NON-USER: CPT | Performed by: PHYSICIAN ASSISTANT

## 2023-10-30 PROCEDURE — 1090F PRES/ABSN URINE INCON ASSESS: CPT | Performed by: PHYSICIAN ASSISTANT

## 2023-10-30 RX ORDER — PREDNISONE 20 MG/1
40 TABLET ORAL DAILY
Qty: 10 TABLET | Refills: 0 | Status: SHIPPED | OUTPATIENT
Start: 2023-10-30 | End: 2023-11-04

## 2023-10-30 RX ORDER — DEXTROMETHORPHAN HYDROBROMIDE AND PROMETHAZINE HYDROCHLORIDE 15; 6.25 MG/5ML; MG/5ML
5 SYRUP ORAL 4 TIMES DAILY PRN
Qty: 120 ML | Refills: 0 | Status: SHIPPED
Start: 2023-10-30 | End: 2023-11-01

## 2023-10-30 RX ORDER — CEFDINIR 300 MG/1
300 CAPSULE ORAL 2 TIMES DAILY
Qty: 20 CAPSULE | Refills: 0 | Status: SHIPPED
Start: 2023-10-30 | End: 2023-11-01 | Stop reason: ALTCHOICE

## 2023-10-30 RX ORDER — ALBUTEROL SULFATE 90 UG/1
2 AEROSOL, METERED RESPIRATORY (INHALATION) 4 TIMES DAILY PRN
Qty: 18 G | Refills: 0 | Status: SHIPPED | OUTPATIENT
Start: 2023-10-30

## 2023-10-30 NOTE — PROGRESS NOTES
Family History   Problem Relation Age of Onset    Stroke Mother     Thyroid Disease Mother     Alcohol Abuse Father     Heart Failure Brother         smoked 3 tours in [facility]       Medications:     Current Outpatient Medications:     cefdinir (OMNICEF) 300 MG capsule, Take 1 capsule by mouth 2 times daily for 10 days, Disp: 20 capsule, Rfl: 0    predniSONE (DELTASONE) 20 MG tablet, Take 2 tablets by mouth daily for 5 days, Disp: 10 tablet, Rfl: 0    albuterol sulfate HFA (PROVENTIL;VENTOLIN;PROAIR) 108 (90 Base) MCG/ACT inhaler, Inhale 2 puffs into the lungs 4 times daily as needed for Wheezing, Disp: 18 g, Rfl: 0    promethazine-dextromethorphan (PROMETHAZINE-DM) 6.25-15 MG/5ML syrup, Take 5 mLs by mouth 4 times daily as needed for Cough, Disp: 120 mL, Rfl: 0    amLODIPine (NORVASC) 10 MG tablet, TAKE ONE TABLET BY MOUTH EVERY DAY, Disp: 90 tablet, Rfl: 3    cycloSPORINE, PF, (CEQUA) 0.09 % SOLN, Apply to eye, Disp: , Rfl:     Elastic Bandages & Supports (JOBST KNEE HIGH COMPRESSION SM) MISC, Compression stockings 20-30 mm hg thigh high bilaterally Dx : Venous Insufficiency, Swelling, Disp: 2 each, Rfl: 2    omeprazole (PRILOSEC) 20 MG delayed release capsule, Take 1 capsule by mouth daily as needed (Patient not taking: Reported on 10/3/2023), Disp: , Rfl:     fluticasone (FLONASE) 50 MCG/ACT nasal spray, 2 sprays by Each Nostril route daily, Disp: 16 g, Rfl: 5    Allergies: Allergies   Allergen Reactions    Psudatabs [Pseudoephedrine Hcl]      Racing heart    Shellfish-Derived Products      Scallops digestive problems         Social History:     Social History     Tobacco Use    Smoking status: Never    Smokeless tobacco: Never   Vaping Use    Vaping Use: Never used   Substance Use Topics    Alcohol use: Yes     Comment: occasional wine    Drug use: No       Patient lives at home.     Physical Exam:     Vitals:    10/30/23 1056   BP: 126/82   Site: Right Upper Arm   Position: Sitting   Pulse: 68   Temp:

## 2023-10-31 ENCOUNTER — TELEPHONE (OUTPATIENT)
Dept: PRIMARY CARE CLINIC | Age: 68
End: 2023-10-31

## 2023-10-31 NOTE — TELEPHONE ENCOUNTER
I didn't see her for this complaint, so I'm not sure what he is treating. What is the concern with Cefdinir?

## 2023-10-31 NOTE — TELEPHONE ENCOUNTER
Patient is concerned that she has been having congestion, drainage and coughing for the past month and doesn't know what the heck is going on. She said she has never had coughing this bad before to where she is \"convulsing\". She is asking if this is a virus that has turned into a bacterial infection? She googled the Cefdinir and it has a bad rep for giving people diarrhea and she doesn't want that on top of everything else that is going on. She said she was able to get some sleep last night and wasn't coughing but today is having a \"rough time\" getting around and doesn't understand why she has had these symptoms and especially the cough for a month.  She also read that you shouldn't take antiacid medication which she takes Omeprazole when taking Cefdinir - please advise

## 2023-10-31 NOTE — TELEPHONE ENCOUNTER
Any antibiotic can cause diarrhea. If the virus turned in to a bacterial infection, the Cefdinir or the Doxy she was on before would have treated the bacteria. I can simply try and inhaler for her to see if that helps calm her cough. Again, unfortunately since I wasn't the one who examined her, I can't specifically tell her what other antibiotic would be appropriate.   She is more than welcome to come in to see me to be re-evaluated before we start anything else

## 2023-10-31 NOTE — TELEPHONE ENCOUNTER
The pt [facility] yesterday and saw [facility], he prescribed her cefdinir, she is calling because she doesn't feel comfortable taking this medication and is asking if something else can be sent over to the pharmacy

## 2023-11-01 ENCOUNTER — OFFICE VISIT (OUTPATIENT)
Dept: PRIMARY CARE CLINIC | Age: 68
End: 2023-11-01
Payer: MEDICARE

## 2023-11-01 ENCOUNTER — TELEPHONE (OUTPATIENT)
Dept: PRIMARY CARE CLINIC | Age: 68
End: 2023-11-01

## 2023-11-01 VITALS
HEART RATE: 72 BPM | HEIGHT: 66 IN | OXYGEN SATURATION: 97 % | BODY MASS INDEX: 29.28 KG/M2 | TEMPERATURE: 97.3 F | DIASTOLIC BLOOD PRESSURE: 82 MMHG | SYSTOLIC BLOOD PRESSURE: 122 MMHG | WEIGHT: 182.2 LBS

## 2023-11-01 DIAGNOSIS — R05.8 POST-VIRAL COUGH SYNDROME: Primary | ICD-10-CM

## 2023-11-01 PROCEDURE — G8484 FLU IMMUNIZE NO ADMIN: HCPCS | Performed by: FAMILY MEDICINE

## 2023-11-01 PROCEDURE — 3074F SYST BP LT 130 MM HG: CPT | Performed by: FAMILY MEDICINE

## 2023-11-01 PROCEDURE — G8399 PT W/DXA RESULTS DOCUMENT: HCPCS | Performed by: FAMILY MEDICINE

## 2023-11-01 PROCEDURE — 99213 OFFICE O/P EST LOW 20 MIN: CPT | Performed by: FAMILY MEDICINE

## 2023-11-01 PROCEDURE — 1090F PRES/ABSN URINE INCON ASSESS: CPT | Performed by: FAMILY MEDICINE

## 2023-11-01 PROCEDURE — G8427 DOCREV CUR MEDS BY ELIG CLIN: HCPCS | Performed by: FAMILY MEDICINE

## 2023-11-01 PROCEDURE — 3079F DIAST BP 80-89 MM HG: CPT | Performed by: FAMILY MEDICINE

## 2023-11-01 PROCEDURE — 3017F COLORECTAL CA SCREEN DOC REV: CPT | Performed by: FAMILY MEDICINE

## 2023-11-01 PROCEDURE — 1123F ACP DISCUSS/DSCN MKR DOCD: CPT | Performed by: FAMILY MEDICINE

## 2023-11-01 PROCEDURE — 1036F TOBACCO NON-USER: CPT | Performed by: FAMILY MEDICINE

## 2023-11-01 PROCEDURE — G8417 CALC BMI ABV UP PARAM F/U: HCPCS | Performed by: FAMILY MEDICINE

## 2023-11-01 RX ORDER — DOXYCYCLINE HYCLATE 100 MG/1
CAPSULE ORAL
COMMUNITY
Start: 2023-10-24 | End: 2023-11-01 | Stop reason: ALTCHOICE

## 2023-11-01 RX ORDER — METHYLPREDNISOLONE 4 MG/1
TABLET ORAL
COMMUNITY
Start: 2023-10-24 | End: 2023-11-01 | Stop reason: ALTCHOICE

## 2023-11-01 RX ORDER — IPRATROPIUM BROMIDE AND ALBUTEROL SULFATE 2.5; .5 MG/3ML; MG/3ML
1 SOLUTION RESPIRATORY (INHALATION) EVERY 4 HOURS PRN
Qty: 360 ML | Refills: 0 | Status: SHIPPED | OUTPATIENT
Start: 2023-11-01

## 2023-11-01 RX ORDER — AZITHROMYCIN 250 MG/1
TABLET, FILM COATED ORAL
Qty: 6 TABLET | Refills: 0 | Status: SHIPPED | OUTPATIENT
Start: 2023-11-01 | End: 2023-11-06

## 2023-11-01 SDOH — ECONOMIC STABILITY: FOOD INSECURITY: WITHIN THE PAST 12 MONTHS, THE FOOD YOU BOUGHT JUST DIDN'T LAST AND YOU DIDN'T HAVE MONEY TO GET MORE.: NEVER TRUE

## 2023-11-01 SDOH — ECONOMIC STABILITY: HOUSING INSECURITY
IN THE LAST 12 MONTHS, WAS THERE A TIME WHEN YOU DID NOT HAVE A STEADY PLACE TO SLEEP OR SLEPT IN A SHELTER (INCLUDING NOW)?: NO

## 2023-11-01 SDOH — ECONOMIC STABILITY: FOOD INSECURITY: WITHIN THE PAST 12 MONTHS, YOU WORRIED THAT YOUR FOOD WOULD RUN OUT BEFORE YOU GOT MONEY TO BUY MORE.: NEVER TRUE

## 2023-11-01 SDOH — ECONOMIC STABILITY: INCOME INSECURITY: HOW HARD IS IT FOR YOU TO PAY FOR THE VERY BASICS LIKE FOOD, HOUSING, MEDICAL CARE, AND HEATING?: NOT HARD AT ALL

## 2023-11-01 ASSESSMENT — ENCOUNTER SYMPTOMS
EYE DISCHARGE: 0
SORE THROAT: 0
CONSTIPATION: 0
SINUS PRESSURE: 0
SHORTNESS OF BREATH: 0
DIARRHEA: 0
ABDOMINAL PAIN: 0
COUGH: 1
NAUSEA: 0
RHINORRHEA: 0
WHEEZING: 1
BACK PAIN: 0
SINUS PAIN: 0
VOMITING: 0

## 2023-11-01 NOTE — TELEPHONE ENCOUNTER
The pt is calling to see if she can get a script for the flonase sent over to pharmacy for her because that is the only flonase the pharmacy says they have, she is also asking if she can get an order for a nebulizer

## 2023-11-01 NOTE — TELEPHONE ENCOUNTER
She is calling back asking if the nebulizer can be sent over to [address] [place] [address]

## 2024-01-23 ENCOUNTER — OFFICE VISIT (OUTPATIENT)
Dept: FAMILY MEDICINE CLINIC | Age: 69
End: 2024-01-23
Payer: MEDICARE

## 2024-01-23 VITALS
TEMPERATURE: 97.8 F | WEIGHT: 182 LBS | OXYGEN SATURATION: 96 % | DIASTOLIC BLOOD PRESSURE: 78 MMHG | HEIGHT: 66 IN | BODY MASS INDEX: 29.25 KG/M2 | HEART RATE: 98 BPM | SYSTOLIC BLOOD PRESSURE: 124 MMHG

## 2024-01-23 DIAGNOSIS — J20.9 ACUTE BRONCHITIS, UNSPECIFIED ORGANISM: Primary | ICD-10-CM

## 2024-01-23 PROCEDURE — 99213 OFFICE O/P EST LOW 20 MIN: CPT

## 2024-01-23 PROCEDURE — G8399 PT W/DXA RESULTS DOCUMENT: HCPCS

## 2024-01-23 PROCEDURE — 1090F PRES/ABSN URINE INCON ASSESS: CPT

## 2024-01-23 PROCEDURE — 3078F DIAST BP <80 MM HG: CPT

## 2024-01-23 PROCEDURE — 1036F TOBACCO NON-USER: CPT

## 2024-01-23 PROCEDURE — 3017F COLORECTAL CA SCREEN DOC REV: CPT

## 2024-01-23 PROCEDURE — G8417 CALC BMI ABV UP PARAM F/U: HCPCS

## 2024-01-23 PROCEDURE — G8427 DOCREV CUR MEDS BY ELIG CLIN: HCPCS

## 2024-01-23 PROCEDURE — 3074F SYST BP LT 130 MM HG: CPT

## 2024-01-23 PROCEDURE — 1123F ACP DISCUSS/DSCN MKR DOCD: CPT

## 2024-01-23 PROCEDURE — G8484 FLU IMMUNIZE NO ADMIN: HCPCS

## 2024-01-23 RX ORDER — OMEPRAZOLE 20 MG/1
20 CAPSULE, DELAYED RELEASE ORAL DAILY
COMMUNITY

## 2024-01-23 RX ORDER — AZITHROMYCIN 250 MG/1
250 TABLET, FILM COATED ORAL SEE ADMIN INSTRUCTIONS
Qty: 6 TABLET | Refills: 0 | Status: SHIPPED | OUTPATIENT
Start: 2024-01-23 | End: 2024-01-28

## 2024-01-23 RX ORDER — PREDNISONE 20 MG/1
20 TABLET ORAL 2 TIMES DAILY
Qty: 10 TABLET | Refills: 0 | Status: SHIPPED | OUTPATIENT
Start: 2024-01-23 | End: 2024-01-28

## 2024-01-29 ENCOUNTER — TELEPHONE (OUTPATIENT)
Dept: PRIMARY CARE CLINIC | Age: 69
End: 2024-01-29

## 2024-01-29 NOTE — TELEPHONE ENCOUNTER
If I haven't seen her since November for this, then she needs re-evaluated.  It would be a CT, not an MRI.  Just need to know what the best days of the week and times of the day are for her to find a spot

## 2024-01-29 NOTE — TELEPHONE ENCOUNTER
The pt was here to see you 11/01 for cough, wheezing, and ear fullness and you had talked to her about getting an MRI because she had had the symptoms for so long, the pt is calling to see if you can order her the MRI because she is still having the wheezing and cough

## 2024-01-31 ENCOUNTER — OFFICE VISIT (OUTPATIENT)
Dept: PRIMARY CARE CLINIC | Age: 69
End: 2024-01-31
Payer: MEDICARE

## 2024-01-31 VITALS
WEIGHT: 172 LBS | TEMPERATURE: 97.4 F | OXYGEN SATURATION: 95 % | HEIGHT: 70 IN | DIASTOLIC BLOOD PRESSURE: 70 MMHG | SYSTOLIC BLOOD PRESSURE: 132 MMHG | BODY MASS INDEX: 24.62 KG/M2 | HEART RATE: 98 BPM

## 2024-01-31 DIAGNOSIS — J40 BRONCHITIS: Primary | ICD-10-CM

## 2024-01-31 PROCEDURE — 1123F ACP DISCUSS/DSCN MKR DOCD: CPT | Performed by: FAMILY MEDICINE

## 2024-01-31 PROCEDURE — G8399 PT W/DXA RESULTS DOCUMENT: HCPCS | Performed by: FAMILY MEDICINE

## 2024-01-31 PROCEDURE — 99213 OFFICE O/P EST LOW 20 MIN: CPT | Performed by: FAMILY MEDICINE

## 2024-01-31 PROCEDURE — G8484 FLU IMMUNIZE NO ADMIN: HCPCS | Performed by: FAMILY MEDICINE

## 2024-01-31 PROCEDURE — 3078F DIAST BP <80 MM HG: CPT | Performed by: FAMILY MEDICINE

## 2024-01-31 PROCEDURE — 3017F COLORECTAL CA SCREEN DOC REV: CPT | Performed by: FAMILY MEDICINE

## 2024-01-31 PROCEDURE — G8420 CALC BMI NORM PARAMETERS: HCPCS | Performed by: FAMILY MEDICINE

## 2024-01-31 PROCEDURE — 1036F TOBACCO NON-USER: CPT | Performed by: FAMILY MEDICINE

## 2024-01-31 PROCEDURE — 1090F PRES/ABSN URINE INCON ASSESS: CPT | Performed by: FAMILY MEDICINE

## 2024-01-31 PROCEDURE — 3075F SYST BP GE 130 - 139MM HG: CPT | Performed by: FAMILY MEDICINE

## 2024-01-31 PROCEDURE — G8427 DOCREV CUR MEDS BY ELIG CLIN: HCPCS | Performed by: FAMILY MEDICINE

## 2024-01-31 ASSESSMENT — ENCOUNTER SYMPTOMS
SINUS PAIN: 0
NAUSEA: 0
DIARRHEA: 0
ABDOMINAL PAIN: 0
COUGH: 1
EYE DISCHARGE: 0
VOMITING: 0
SINUS PRESSURE: 0
SORE THROAT: 0
CONSTIPATION: 0
RHINORRHEA: 0
BACK PAIN: 0
SHORTNESS OF BREATH: 0
WHEEZING: 1

## 2024-01-31 NOTE — PROGRESS NOTES
normal. No respiratory distress.      Breath sounds: Normal breath sounds. No wheezing.   Musculoskeletal:         General: Normal range of motion.      Cervical back: Normal range of motion and neck supple. No tenderness.      Right lower leg: No edema.      Left lower leg: No edema.   Lymphadenopathy:      Cervical: No cervical adenopathy.   Skin:     General: Skin is warm and dry.      Findings: No rash.   Neurological:      General: No focal deficit present.      Mental Status: She is alert and oriented to person, place, and time.      Gait: Gait normal.   Psychiatric:         Mood and Affect: Mood normal.         Behavior: Behavior normal.         Thought Content: Thought content normal.         Labs:  CBC with Differential:    Lab Results   Component Value Date/Time    WBC 6.3 06/01/2023 08:21 AM    RBC 4.97 06/01/2023 08:21 AM    HGB 14.7 06/01/2023 08:21 AM    HCT 47.2 06/01/2023 08:21 AM     06/01/2023 08:21 AM    MCV 95.0 06/01/2023 08:21 AM    MCH 29.6 06/01/2023 08:21 AM    MCHC 31.1 06/01/2023 08:21 AM    RDW 13.5 06/01/2023 08:21 AM    LYMPHOPCT 42.0 06/01/2023 08:21 AM    MONOPCT 16.4 06/01/2023 08:21 AM    BASOPCT 2.1 06/01/2023 08:21 AM    MONOSABS 1.04 06/01/2023 08:21 AM    LYMPHSABS 2.66 06/01/2023 08:21 AM    EOSABS 0.26 06/01/2023 08:21 AM    BASOSABS 0.13 06/01/2023 08:21 AM     CMP:    Lab Results   Component Value Date/Time     06/01/2023 08:21 AM    K 4.6 06/01/2023 08:21 AM     06/01/2023 08:21 AM    CO2 23 06/01/2023 08:21 AM    BUN 13 06/01/2023 08:21 AM    CREATININE 0.7 06/01/2023 08:21 AM    GFRAA >60 06/15/2022 09:05 AM    LABGLOM >60 06/01/2023 08:21 AM    GLUCOSE 93 06/01/2023 08:21 AM    PROT 7.8 06/01/2023 08:21 AM    LABALBU 4.3 06/01/2023 08:21 AM    CALCIUM 9.6 06/01/2023 08:21 AM    BILITOT 0.4 06/01/2023 08:21 AM    ALKPHOS 88 06/01/2023 08:21 AM    AST 22 06/01/2023 08:21 AM    ALT 20 06/01/2023 08:21 AM     U/A:    Lab Results   Component Value Date/Time

## 2024-04-08 ENCOUNTER — OFFICE VISIT (OUTPATIENT)
Dept: PRIMARY CARE CLINIC | Age: 69
End: 2024-04-08
Payer: MEDICARE

## 2024-04-08 VITALS
WEIGHT: 163 LBS | TEMPERATURE: 98.2 F | HEIGHT: 69 IN | OXYGEN SATURATION: 94 % | HEART RATE: 72 BPM | BODY MASS INDEX: 24.14 KG/M2 | DIASTOLIC BLOOD PRESSURE: 72 MMHG | SYSTOLIC BLOOD PRESSURE: 124 MMHG

## 2024-04-08 DIAGNOSIS — E55.9 VITAMIN D INSUFFICIENCY: ICD-10-CM

## 2024-04-08 DIAGNOSIS — I10 ESSENTIAL HYPERTENSION: ICD-10-CM

## 2024-04-08 DIAGNOSIS — M25.50 POLYARTHRALGIA: ICD-10-CM

## 2024-04-08 DIAGNOSIS — R73.01 IMPAIRED FASTING GLUCOSE: ICD-10-CM

## 2024-04-08 DIAGNOSIS — Z00.00 MEDICARE ANNUAL WELLNESS VISIT, SUBSEQUENT: Primary | ICD-10-CM

## 2024-04-08 PROCEDURE — 1123F ACP DISCUSS/DSCN MKR DOCD: CPT | Performed by: FAMILY MEDICINE

## 2024-04-08 PROCEDURE — 3078F DIAST BP <80 MM HG: CPT | Performed by: FAMILY MEDICINE

## 2024-04-08 PROCEDURE — 3017F COLORECTAL CA SCREEN DOC REV: CPT | Performed by: FAMILY MEDICINE

## 2024-04-08 PROCEDURE — 3074F SYST BP LT 130 MM HG: CPT | Performed by: FAMILY MEDICINE

## 2024-04-08 PROCEDURE — G0439 PPPS, SUBSEQ VISIT: HCPCS | Performed by: FAMILY MEDICINE

## 2024-04-08 RX ORDER — M-VIT,TX,IRON,MINS/CALC/FOLIC 27MG-0.4MG
1 TABLET ORAL DAILY
COMMUNITY

## 2024-04-08 ASSESSMENT — PATIENT HEALTH QUESTIONNAIRE - PHQ9
1. LITTLE INTEREST OR PLEASURE IN DOING THINGS: NOT AT ALL
SUM OF ALL RESPONSES TO PHQ QUESTIONS 1-9: 0
2. FEELING DOWN, DEPRESSED OR HOPELESS: NOT AT ALL
SUM OF ALL RESPONSES TO PHQ9 QUESTIONS 1 & 2: 0
SUM OF ALL RESPONSES TO PHQ QUESTIONS 1-9: 0

## 2024-04-08 ASSESSMENT — LIFESTYLE VARIABLES
HOW MANY STANDARD DRINKS CONTAINING ALCOHOL DO YOU HAVE ON A TYPICAL DAY: 1 OR 2
HOW OFTEN DO YOU HAVE A DRINK CONTAINING ALCOHOL: 2-4 TIMES A MONTH

## 2024-04-08 NOTE — PROGRESS NOTES
check labs.          Safety:  Do you have non-slip mats or non-slip surfaces or shower bars or grab bars in your shower or bathtub?: (!) No  Interventions:  Patient declined any further interventions or treatment               Objective   Vitals:    04/08/24 0911   BP: 124/72   Pulse: 72   Temp: 98.2 °F (36.8 °C)   SpO2: 94%   Weight: 73.9 kg (163 lb)   Height: 1.753 m (5' 9\")      Body mass index is 24.07 kg/m².        Physical Exam  Vitals and nursing note reviewed.   Constitutional:       General: She is not in acute distress.     Appearance: Normal appearance. She is well-developed and normal weight. She is not ill-appearing.   HENT:      Head: Normocephalic and atraumatic.      Right Ear: Hearing and external ear normal.      Left Ear: Hearing and external ear normal.      Mouth/Throat:      Mouth: Mucous membranes are moist.   Eyes:      General:         Right eye: No discharge.         Left eye: No discharge.      Extraocular Movements: Extraocular movements intact.      Conjunctiva/sclera: Conjunctivae normal.   Neck:      Vascular: No carotid bruit.   Cardiovascular:      Rate and Rhythm: Normal rate and regular rhythm.      Heart sounds: Normal heart sounds. No murmur heard.  Pulmonary:      Effort: Pulmonary effort is normal. No respiratory distress.      Breath sounds: Normal breath sounds. No wheezing.   Musculoskeletal:         General: Normal range of motion.      Cervical back: Normal range of motion and neck supple. No tenderness.      Right lower leg: No edema.      Left lower leg: No edema.   Lymphadenopathy:      Cervical: No cervical adenopathy.   Skin:     General: Skin is warm and dry.      Findings: No rash.   Neurological:      General: No focal deficit present.      Mental Status: She is alert and oriented to person, place, and time.      Gait: Gait normal.   Psychiatric:         Mood and Affect: Mood normal.         Behavior: Behavior normal.         Thought Content: Thought content

## 2024-04-08 NOTE — PATIENT INSTRUCTIONS
Personalized Preventive Plan for Tameka Barnes - 4/8/2024  Medicare offers a range of preventive health benefits. Some of the tests and screenings are paid in full while other may be subject to a deductible, co-insurance, and/or copay.    Some of these benefits include a comprehensive review of your medical history including lifestyle, illnesses that may run in your family, and various assessments and screenings as appropriate.    After reviewing your medical record and screening and assessments performed today your provider may have ordered immunizations, labs, imaging, and/or referrals for you.  A list of these orders (if applicable) as well as your Preventive Care list are included within your After Visit Summary for your review.    Other Preventive Recommendations:    A preventive eye exam performed by an eye specialist is recommended every 1-2 years to screen for glaucoma; cataracts, macular degeneration, and other eye disorders.  A preventive dental visit is recommended every 6 months.  Try to get at least 150 minutes of exercise per week or 10,000 steps per day on a pedometer .  Order or download the FREE \"Exercise & Physical Activity: Your Everyday Guide\" from [employer]. Call 1-472.826.7616 or search [employer] online.  You need 7747-5179 mg of calcium and 5010-8155 IU of vitamin D per day. It is possible to meet your calcium requirement with diet alone, but a vitamin D supplement is usually necessary to meet this goal.  When exposed to the sun, use a sunscreen that protects against both UVA and UVB radiation with an SPF of 30 or greater. Reapply every 2 to 3 hours or after sweating, drying off with a towel, or swimming.  Always wear a seat belt when traveling in a car. Always wear a helmet when riding a bicycle or motorcycle.

## 2024-04-15 DIAGNOSIS — M25.50 POLYARTHRALGIA: ICD-10-CM

## 2024-04-15 DIAGNOSIS — R73.01 IMPAIRED FASTING GLUCOSE: ICD-10-CM

## 2024-04-15 DIAGNOSIS — E55.9 VITAMIN D INSUFFICIENCY: ICD-10-CM

## 2024-04-15 DIAGNOSIS — I10 ESSENTIAL HYPERTENSION: ICD-10-CM

## 2024-04-15 LAB
ALBUMIN SERPL-MCNC: 4.3 G/DL (ref 3.5–5.2)
ALP BLD-CCNC: 81 U/L (ref 35–104)
ALT SERPL-CCNC: 12 U/L (ref 0–32)
ANION GAP SERPL CALCULATED.3IONS-SCNC: 15 MMOL/L (ref 7–16)
AST SERPL-CCNC: 21 U/L (ref 0–31)
BASOPHILS ABSOLUTE: 0.11 K/UL (ref 0–0.2)
BASOPHILS RELATIVE PERCENT: 2 % (ref 0–2)
BILIRUB SERPL-MCNC: 0.5 MG/DL (ref 0–1.2)
BILIRUBIN URINE: NEGATIVE
BUN BLDV-MCNC: 13 MG/DL (ref 6–23)
C-REACTIVE PROTEIN: <3 MG/L (ref 0–5)
CALCIUM SERPL-MCNC: 9.4 MG/DL (ref 8.6–10.2)
CHLORIDE BLD-SCNC: 106 MMOL/L (ref 98–107)
CHOLESTEROL: 218 MG/DL
CO2: 19 MMOL/L (ref 22–29)
COLOR: YELLOW
COMMENT: NORMAL
CREAT SERPL-MCNC: 0.6 MG/DL (ref 0.5–1)
EOSINOPHILS ABSOLUTE: 0.32 K/UL (ref 0.05–0.5)
EOSINOPHILS RELATIVE PERCENT: 4 % (ref 0–6)
GFR SERPL CREATININE-BSD FRML MDRD: >90 ML/MIN/1.73M2
GLUCOSE BLD-MCNC: 91 MG/DL (ref 74–99)
GLUCOSE URINE: NEGATIVE MG/DL
HBA1C MFR BLD: 5.1 % (ref 4–5.6)
HCT VFR BLD CALC: 46.5 % (ref 34–48)
HDLC SERPL-MCNC: 63 MG/DL
HEMOGLOBIN: 15.5 G/DL (ref 11.5–15.5)
IMMATURE GRANULOCYTES %: 0 % (ref 0–5)
IMMATURE GRANULOCYTES ABSOLUTE: 0.03 K/UL (ref 0–0.58)
KETONES, URINE: NEGATIVE MG/DL
LDL CHOLESTEROL: 138 MG/DL
LEUKOCYTE ESTERASE, URINE: NEGATIVE
LYMPHOCYTES ABSOLUTE: 2.8 K/UL (ref 1.5–4)
LYMPHOCYTES RELATIVE PERCENT: 38 % (ref 20–42)
MCH RBC QN AUTO: 30.5 PG (ref 26–35)
MCHC RBC AUTO-ENTMCNC: 33.3 G/DL (ref 32–34.5)
MCV RBC AUTO: 91.5 FL (ref 80–99.9)
MONOCYTES ABSOLUTE: 1.02 K/UL (ref 0.1–0.95)
MONOCYTES RELATIVE PERCENT: 14 % (ref 2–12)
NEUTROPHILS ABSOLUTE: 3.14 K/UL (ref 1.8–7.3)
NEUTROPHILS RELATIVE PERCENT: 42 % (ref 43–80)
NITRITE, URINE: NEGATIVE
PDW BLD-RTO: 13.1 % (ref 11.5–15)
PH UA: 6 (ref 5–9)
PLATELET # BLD: 391 K/UL (ref 130–450)
PMV BLD AUTO: 10 FL (ref 7–12)
POTASSIUM SERPL-SCNC: 4.2 MMOL/L (ref 3.5–5)
PROTEIN UA: NEGATIVE MG/DL
RBC # BLD: 5.08 M/UL (ref 3.5–5.5)
SEDIMENTATION RATE, ERYTHROCYTE: 11 MM/HR (ref 0–20)
SODIUM BLD-SCNC: 140 MMOL/L (ref 132–146)
SPECIFIC GRAVITY UA: 1.02 (ref 1–1.03)
T4 FREE: 1.2 NG/DL (ref 0.9–1.7)
TOTAL PROTEIN: 7.6 G/DL (ref 6.4–8.3)
TRIGL SERPL-MCNC: 86 MG/DL
TSH SERPL DL<=0.05 MIU/L-ACNC: 2.04 UIU/ML (ref 0.27–4.2)
TURBIDITY: CLEAR
URINE HGB: NEGATIVE
UROBILINOGEN, URINE: 0.2 EU/DL (ref 0–1)
VITAMIN D 25-HYDROXY: 33.3 NG/ML (ref 30–100)
VLDLC SERPL CALC-MCNC: 17 MG/DL
WBC # BLD: 7.4 K/UL (ref 4.5–11.5)

## 2024-04-18 LAB — BORRELIA BURGDORFERI ABS TOTAL: 0.12 IV

## 2024-04-26 PROCEDURE — 88305 TISSUE EXAM BY PATHOLOGIST: CPT | Performed by: DERMATOLOGY

## 2024-04-29 ENCOUNTER — LAB REQUISITION (OUTPATIENT)
Dept: DERMATOPATHOLOGY | Facility: CLINIC | Age: 69
End: 2024-04-29
Payer: MEDICARE

## 2024-04-29 DIAGNOSIS — D48.5 NEOPLASM OF UNCERTAIN BEHAVIOR OF SKIN: ICD-10-CM

## 2024-04-30 LAB
LABORATORY COMMENT REPORT: NORMAL
PATH REPORT.FINAL DX SPEC: NORMAL
PATH REPORT.GROSS SPEC: NORMAL
PATH REPORT.RELEVANT HX SPEC: NORMAL
PATH REPORT.TOTAL CANCER: NORMAL

## 2024-07-01 LAB — MAMMOGRAPHY, EXTERNAL: NORMAL

## 2024-07-08 DIAGNOSIS — I10 ESSENTIAL HYPERTENSION: ICD-10-CM

## 2024-07-09 RX ORDER — AMLODIPINE BESYLATE 10 MG/1
TABLET ORAL
Qty: 90 TABLET | Refills: 1 | Status: SHIPPED | OUTPATIENT
Start: 2024-07-09

## 2024-08-15 LAB
25(OH)D3 SERPL-MCNC: 26.3 NG/ML (ref 30–100)
ALBUMIN SERPL-MCNC: 3.9 G/DL (ref 3.5–5.2)
ALP SERPL-CCNC: 77 U/L (ref 35–104)
ALT SERPL-CCNC: 12 U/L (ref 0–32)
ANION GAP SERPL CALCULATED.3IONS-SCNC: 15 MMOL/L (ref 7–16)
AST SERPL-CCNC: 22 U/L (ref 0–31)
BASOPHILS # BLD: 0.1 K/UL (ref 0–0.2)
BASOPHILS NFR BLD: 2 % (ref 0–2)
BILIRUB SERPL-MCNC: 0.5 MG/DL (ref 0–1.2)
BUN SERPL-MCNC: 10 MG/DL (ref 6–23)
CALCIUM SERPL-MCNC: 9.6 MG/DL (ref 8.6–10.2)
CHLORIDE SERPL-SCNC: 104 MMOL/L (ref 98–107)
CO2 SERPL-SCNC: 20 MMOL/L (ref 22–29)
CREAT SERPL-MCNC: 0.8 MG/DL (ref 0.5–1)
CREAT UR-MCNC: 324.7 MG/DL (ref 29–226)
CREAT UR-MCNC: 328.5 MG/DL (ref 29–226)
EOSINOPHIL # BLD: 0.42 K/UL (ref 0.05–0.5)
EOSINOPHILS RELATIVE PERCENT: 6 % (ref 0–6)
ERYTHROCYTE [DISTWIDTH] IN BLOOD BY AUTOMATED COUNT: 13.5 % (ref 11.5–15)
GFR, ESTIMATED: 80 ML/MIN/1.73M2
GLUCOSE SERPL-MCNC: 78 MG/DL (ref 74–99)
HCT VFR BLD AUTO: 45.1 % (ref 34–48)
HGB BLD-MCNC: 14.6 G/DL (ref 11.5–15.5)
IMM GRANULOCYTES # BLD AUTO: <0.03 K/UL (ref 0–0.58)
IMM GRANULOCYTES NFR BLD: 0 % (ref 0–5)
LYMPHOCYTES NFR BLD: 2.73 K/UL (ref 1.5–4)
LYMPHOCYTES RELATIVE PERCENT: 41 % (ref 20–42)
MAGNESIUM SERPL-MCNC: 2.4 MG/DL (ref 1.6–2.6)
MCH RBC QN AUTO: 30.2 PG (ref 26–35)
MCHC RBC AUTO-ENTMCNC: 32.4 G/DL (ref 32–34.5)
MCV RBC AUTO: 93.2 FL (ref 80–99.9)
MICROALBUMIN UR-MCNC: 24 MG/L (ref 0–19)
MICROALBUMIN/CREAT UR-RTO: 7 MCG/MG CREAT (ref 0–30)
MONOCYTES NFR BLD: 1 K/UL (ref 0.1–0.95)
MONOCYTES NFR BLD: 15 % (ref 2–12)
NEUTROPHILS NFR BLD: 36 % (ref 43–80)
NEUTS SEG NFR BLD: 2.35 K/UL (ref 1.8–7.3)
PHOSPHATE SERPL-MCNC: 3.3 MG/DL (ref 2.5–4.5)
PLATELET # BLD AUTO: 346 K/UL (ref 130–450)
PMV BLD AUTO: 10 FL (ref 7–12)
POTASSIUM SERPL-SCNC: 4.4 MMOL/L (ref 3.5–5)
PROT SERPL-MCNC: 7.2 G/DL (ref 6.4–8.3)
RBC # BLD AUTO: 4.84 M/UL (ref 3.5–5.5)
SODIUM SERPL-SCNC: 139 MMOL/L (ref 132–146)
TOTAL PROTEIN, URINE: 25 MG/DL (ref 0–12)
URATE SERPL-MCNC: 4.5 MG/DL (ref 2.4–5.7)
URINE TOTAL PROTEIN CREATININE RATIO: 0.08 (ref 0–0.2)
WBC OTHER # BLD: 6.6 K/UL (ref 4.5–11.5)

## 2024-10-04 NOTE — PROGRESS NOTES
Pleasure meeting with you today!    Let me know if you need anything.     Please send me a MyChart message if you have any questions or concerns.  FOR NON URGENT questions only.  Allow up to 72 hours for response.     If you have prescription issues or other questions you can email   Lowell Moya,  Digital Health Coordinator, at   [email or web address]      CREATININE 0.7 06/01/2023 08:21 AM    GFRAA >60 06/15/2022 09:05 AM    LABGLOM >60 06/01/2023 08:21 AM    GLUCOSE 93 06/01/2023 08:21 AM    PROT 7.8 06/01/2023 08:21 AM    LABALBU 4.3 06/01/2023 08:21 AM    CALCIUM 9.6 06/01/2023 08:21 AM    BILITOT 0.4 06/01/2023 08:21 AM    ALKPHOS 88 06/01/2023 08:21 AM    AST 22 06/01/2023 08:21 AM    ALT 20 06/01/2023 08:21 AM     U/A:    Lab Results   Component Value Date/Time    NITRITE negative 08/09/2021 11:20 AM    COLORU Yellow 06/01/2023 08:42 AM    PROTEINU Negative 06/01/2023 08:42 AM    PHUR 7.0 06/01/2023 08:42 AM    WBCUA NONE 06/15/2022 09:05 AM    RBCUA NONE 06/15/2022 09:05 AM    BACTERIA RARE 06/15/2022 09:05 AM    CLARITYU Clear 06/01/2023 08:42 AM    SPECGRAV 1.010 06/01/2023 08:42 AM    LEUKOCYTESUR Negative 06/01/2023 08:42 AM    UROBILINOGEN 0.2 06/01/2023 08:42 AM    BILIRUBINUR Negative 06/01/2023 08:42 AM    BILIRUBINUR negative 08/09/2021 11:20 AM    BLOODU Negative 06/01/2023 08:42 AM    GLUCOSEU Negative 06/01/2023 08:42 AM    AMORPHOUS FEW 10/28/2016 05:20 PM     HgBA1c:    Lab Results   Component Value Date/Time    LABA1C 5.6 06/01/2023 08:21 AM     FLP:    Lab Results   Component Value Date/Time    TRIG 102 06/01/2023 08:21 AM    HDL 66 06/01/2023 08:21 AM    LDLCALC 165 06/01/2023 08:21 AM    LABVLDL 20 06/01/2023 08:21 AM     TSH:    Lab Results   Component Value Date/Time    TSH 2.560 06/01/2023 08:21 AM        Assessment and Plan:  Cecil Garcia was seen today for uri, cough, wheezing and ear fullness. Diagnoses and all orders for this visit:    Post-viral cough syndrome  -     azithromycin (ZITHROMAX) 250 MG tablet; Take 2 tablets by mouth daily for 1 day, THEN 1 tablet daily for 4 days. -     ipratropium 0.5 mg-albuterol 2.5 mg (DUONEB) 0.5-2.5 (3) MG/3ML SOLN nebulizer solution;  Inhale 3 mLs into the lungs every 4 hours as needed for Shortness of Breath  -     DME Order for Nebulizer as OP    Suspect patient's persistent symptoms are

## 2024-10-08 ENCOUNTER — OFFICE VISIT (OUTPATIENT)
Dept: PRIMARY CARE CLINIC | Age: 69
End: 2024-10-08

## 2024-10-08 VITALS
BODY MASS INDEX: 23.7 KG/M2 | TEMPERATURE: 97.5 F | DIASTOLIC BLOOD PRESSURE: 76 MMHG | WEIGHT: 160 LBS | HEART RATE: 71 BPM | SYSTOLIC BLOOD PRESSURE: 110 MMHG | OXYGEN SATURATION: 97 % | HEIGHT: 69 IN

## 2024-10-08 DIAGNOSIS — I10 ESSENTIAL HYPERTENSION: Primary | ICD-10-CM

## 2024-10-08 DIAGNOSIS — M54.42 ACUTE LEFT-SIDED LOW BACK PAIN WITH LEFT-SIDED SCIATICA: ICD-10-CM

## 2024-10-08 DIAGNOSIS — Z23 NEED FOR INFLUENZA VACCINATION: ICD-10-CM

## 2024-10-08 DIAGNOSIS — R73.01 IMPAIRED FASTING GLUCOSE: ICD-10-CM

## 2024-10-08 DIAGNOSIS — E55.9 VITAMIN D INSUFFICIENCY: ICD-10-CM

## 2024-10-08 DIAGNOSIS — R93.1 ABNORMAL ECHOCARDIOGRAM: ICD-10-CM

## 2024-10-08 RX ORDER — AMLODIPINE BESYLATE 10 MG/1
10 TABLET ORAL DAILY
Qty: 90 TABLET | Refills: 1 | Status: SHIPPED | OUTPATIENT
Start: 2024-10-08

## 2024-10-08 ASSESSMENT — ENCOUNTER SYMPTOMS
NAUSEA: 0
BACK PAIN: 1
CONSTIPATION: 0
VOMITING: 0
SHORTNESS OF BREATH: 0
COUGH: 0
ABDOMINAL PAIN: 0
DIARRHEA: 0
WHEEZING: 0

## 2024-10-08 NOTE — ASSESSMENT & PLAN NOTE
Well controlled on Amlodipine.  Continue current dosing.  Labs from Dr. Chew reviewed.  Check lipids/thyroid at this time.     Orders:    Comprehensive Metabolic Panel; Future    Lipid Panel; Future    TSH; Future    amLODIPine (NORVASC) 10 MG tablet; Take 1 tablet by mouth daily

## 2024-10-08 NOTE — PROGRESS NOTES
10/8/24  Tameka KOBE Barnes : 1955 Sex: female  Age: 69 y.o.    Chief Complaint   Patient presents with    Hypertension     Had an echo back in  and just wants to follow up on this and isnt sure if she needs another one or not. Not having any symptoms    Discuss Labs     Had labs done in Aug for different dr but her apt was cancelled so she wants to discuss those labs with you     Knee Pain     R knee, has been bothering her on and off since last summer . L knee started at the end of Aug this year had shooting pain down her L leg, was seen at [facility] last week. She is doing PT and it is helping but still not back to normal       HPI:  69 y.o. female presents today for 6 month follow up of chronic medical conditions and FBW.  Patient's chart, medical, surgical and medication history all reviewed.    Hypertension   The patient presents today for follow up of HTN.  The problem is well controlled. Risk factors for coronary artery disease include Age > 30 and HTN. Current treatments include amlodipine (Norvasc). The patient is compliant all of the time.  Lifestyle changes the patient has made include dietary improvement(s), improved knowledge of chronic condition(s) and weight management.  Today the patient is complaining of none.    She has been seen by Cardiology due to various symptoms.  Mild LV thickening on Echo.  Otherwise normal cardiac work up.   She was also seen by Dr. Chew.  Normal renal US, normal ambulatory BP monitor.  Recommended continuation of Amlodipine for now.     Patient also complains of bilateral leg achiness.  Constant issue.  History of varicose veins with previous treatment to R leg.  R leg swells more than L.  Leg pain does not improve with rest.  Seen by Dr. BARNETT in 2022 and recommended compression stockings.     Back Pain  69 y.o. female presents today with complaint of back pain. The pain started in 2024.  The patient denies any trauma or injury.

## 2025-01-08 ENCOUNTER — OFFICE VISIT (OUTPATIENT)
Dept: FAMILY MEDICINE CLINIC | Age: 70
End: 2025-01-08

## 2025-01-08 VITALS
DIASTOLIC BLOOD PRESSURE: 62 MMHG | OXYGEN SATURATION: 97 % | WEIGHT: 159 LBS | RESPIRATION RATE: 18 BRPM | BODY MASS INDEX: 23.48 KG/M2 | SYSTOLIC BLOOD PRESSURE: 116 MMHG | HEART RATE: 87 BPM | TEMPERATURE: 97.9 F

## 2025-01-08 DIAGNOSIS — R09.81 NASAL CONGESTION: ICD-10-CM

## 2025-01-08 DIAGNOSIS — R51.9 SINUS HEADACHE: ICD-10-CM

## 2025-01-08 DIAGNOSIS — R09.82 POSTNASAL DRIP: ICD-10-CM

## 2025-01-08 DIAGNOSIS — J01.90 ACUTE NON-RECURRENT SINUSITIS, UNSPECIFIED LOCATION: Primary | ICD-10-CM

## 2025-01-08 LAB
INFLUENZA A ANTIBODY: NORMAL
INFLUENZA B ANTIBODY: NORMAL
Lab: NORMAL
PERFORMING INSTRUMENT: NORMAL
QC PASS/FAIL: NORMAL
RSV RNA: NORMAL
SARS-COV-2, POC: NORMAL

## 2025-01-08 RX ORDER — PREDNISONE 10 MG/1
TABLET ORAL
Qty: 18 TABLET | Refills: 0 | Status: SHIPPED | OUTPATIENT
Start: 2025-01-08

## 2025-01-08 NOTE — PROGRESS NOTES
25  Tameka Barnes : 1955 Sex: female  Age 69 y.o.      Subjective:  Chief Complaint   Patient presents with    Nasal Congestion    Headache    Fatigue         HPI:     History of Present Illness  The patient is a 69-year-old female who presents for evaluation of sinus issues.    She reports experiencing severe sinus issues, characterized by dryness, which she has not previously encountered. She has not had a sinus infection before. She is not coughing and is hardly blowing her nose. She has been using a steamer and taking showers to alleviate the symptoms. Over the weekend, she experienced a persistent fever ranging from 100 to 102 degrees, which has since subsided. However, she continues to experience discomfort at night. She has received her influenza vaccine but has not yet received the RSV vaccine. She contracted COVID-19 approximately 2 years ago and reports no history of pulmonary complications or smoking.    SOCIAL HISTORY  She does not smoke.    IMMUNIZATIONS  She has received an influenza vaccine.            ROS:   Unless otherwise stated in this report the patient's positive and negative responses for review of systems for constitutional, eyes, ENT, cardiovascular, respiratory, gastrointestinal, neurological, , musculoskeletal, and integument systems and related systems to the presenting problem are either stated in the history of present illness or were not pertinent or were negative for the symptoms and/or complaints related to the presenting medical problem.  Positives and pertinent negatives as per HPI.  All others reviewed and are negative.      PMH:     Past Medical History:   Diagnosis Date    Hypertension     Leg pain     Leg swelling     Visual disturbance     history of stigmatism since childhood       Past Surgical History:   Procedure Laterality Date    APPENDECTOMY      ARM SURGERY Left     cyst    CATARACT EXTRACTION      COLONOSCOPY      LIPOSUCTION Bilateral     Upper

## 2025-01-17 ENCOUNTER — TELEPHONE (OUTPATIENT)
Dept: PRIMARY CARE CLINIC | Age: 70
End: 2025-01-17

## 2025-01-17 NOTE — TELEPHONE ENCOUNTER
Pt says back in nov. Of 2023 she had to pay out of pocket for her nebulizer and just wanted us to know that she doesn't care about that but was told it wasn't covered by her insurance with the Dx of Post Viral Cough R08.8, it was to be more specific, such as Bronchitis 48.8 , she just wanted us to know and didn't expect anything to be done at this point.

## 2025-04-29 ENCOUNTER — TELEPHONE (OUTPATIENT)
Dept: PRIMARY CARE CLINIC | Age: 70
End: 2025-04-29

## 2025-04-29 DIAGNOSIS — R73.01 IMPAIRED FASTING GLUCOSE: ICD-10-CM

## 2025-04-29 DIAGNOSIS — I10 ESSENTIAL HYPERTENSION: ICD-10-CM
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
## 2025-04-29 DIAGNOSIS — E55.9 VITAMIN D INSUFFICIENCY: ICD-10-CM

## 2025-04-29 RX ORDER — AMLODIPINE BESYLATE 10 MG/1
10 TABLET ORAL DAILY
Qty: 90 TABLET | Refills: 1 | Status: SHIPPED | OUTPATIENT
Start: 2025-04-29

## 2025-04-29 NOTE — TELEPHONE ENCOUNTER
She had to cancel her appt 4/17.  Who do you want us to schedule her with?  Or do you want to give her enough meds until you come back.  Please advise.

## 2025-04-30 NOTE — TELEPHONE ENCOUNTER
Patient is scheduled for an apt in sept, has labs ordered would like to add lyme onto that as she was bit recently. She would like to get labs done asap if it is ok

## 2025-06-05 ENCOUNTER — OFFICE VISIT (OUTPATIENT)
Dept: FAMILY MEDICINE CLINIC | Age: 70
End: 2025-06-05

## 2025-06-05 VITALS
BODY MASS INDEX: 25.23 KG/M2 | RESPIRATION RATE: 16 BRPM | OXYGEN SATURATION: 97 % | HEART RATE: 67 BPM | DIASTOLIC BLOOD PRESSURE: 80 MMHG | TEMPERATURE: 96.8 F | SYSTOLIC BLOOD PRESSURE: 130 MMHG | WEIGHT: 157 LBS | HEIGHT: 66 IN

## 2025-06-05 DIAGNOSIS — H65.02 ACUTE SEROUS OTITIS MEDIA OF LEFT EAR, RECURRENCE NOT SPECIFIED: Primary | ICD-10-CM

## 2025-06-05 DIAGNOSIS — H69.92 EUSTACHIAN TUBE DYSFUNCTION, LEFT: ICD-10-CM

## 2025-06-05 RX ORDER — PREDNISONE 10 MG/1
TABLET ORAL
Qty: 18 TABLET | Refills: 0 | Status: SHIPPED | OUTPATIENT
Start: 2025-06-05 | End: 2025-06-14

## 2025-06-05 RX ORDER — CYCLOSPORINE 0.5 MG/ML
EMULSION OPHTHALMIC
COMMUNITY
Start: 2025-05-27

## 2025-06-05 RX ORDER — CEFDINIR 300 MG/1
300 CAPSULE ORAL EVERY 12 HOURS
Qty: 20 CAPSULE | Refills: 0 | Status: SHIPPED | OUTPATIENT
Start: 2025-06-05 | End: 2025-06-15

## 2025-06-05 NOTE — PROGRESS NOTES
External Canals: Canals without discharge, edema or, erythema bilaterally. Left TM with serous effusion and mild bulging without acute erythema. Right TM translucent without erythema. No perforation bilaterally. No pre/post auricular or mastoid tenderness or redness.  Nose: Mild congestion of the nasal mucosa.  Throat:  Posterior pharynx without injection, exudate, or tonsillar hypertrophy.  Airway patient.  Neck:  Normal ROM.  Supple.  No adenopathy.    Respiratory:  CTAB without wheezing, rales, or rhonchi  CV: Regular rate and rhythm, normal heart sounds, without pathological murmurs, ectopy, gallops, or rubs.  Skin:  Moist and warm without rashes or lesions.  Lymphatic: No lymphangitis or adenopathy noted.  Neurological:  Oriented.  Motor functions intact.      Lab / Imaging Results   (All laboratory and radiology results have been personally reviewed by myself)  Labs:  No results found for this visit on 06/05/25.    Assessment / Plan     Impression(s):  Tameka was seen today for ear pain.    Diagnoses and all orders for this visit:    Acute serous otitis media of left ear, recurrence not specified  -     predniSONE (DELTASONE) 10 MG tablet; Take 3 tablets by mouth daily for 3 days, THEN 2 tablets daily for 3 days, THEN 1 tablet daily for 3 days.    Eustachian tube dysfunction, left  -     predniSONE (DELTASONE) 10 MG tablet; Take 3 tablets by mouth daily for 3 days, THEN 2 tablets daily for 3 days, THEN 1 tablet daily for 3 days.    Other orders  -     cefdinir (OMNICEF) 300 MG capsule; Take 1 capsule by mouth every 12 hours for 10 days      Disposition:  Disposition: Discharge to home.    Vital signs, past medical history, medications, and allergies reviewed at visit.    Symptoms do seem seasonal induced.    Script written for prednisone, side effects discussed.  Agreeable to provide cefdinir if no improvement as patient is extremely paranoid due to  and son having similar symptoms and being placed on

## 2025-07-15 NOTE — PROGRESS NOTES
2024     Tameka Barnes 68 y.o. female    : 1955   Chief Complaint:   Cough and Wheezing      History of Present Illness   Source of history provided by:  patient.    Tameka Barnes is a 68 y.o. old female who presents to walk-in for evaluation of cough x 6 days. Associated symptoms include wheezing.  Since onset symptoms have been consistent.  Patient has had no known Covid 19 exposure.  Patient has not been diagnosed with COVID-19 in the last 90 days.  Has taken pain medications for a headache at home with some symptomatic relief. Denies any fever, chills, CP, dyspnea, LE edema, abdominal pain, nausea, vomiting, rash, dizziness, or lethargy. Denies any history of asthma, pneumonia, recurrent bronchitis or COPD.  They have no history of tobacco abuse.        ROS   Past Medical History:   Past Medical History:   Diagnosis Date    Hypertension     Leg pain     Leg swelling     Visual disturbance     history of stigmatism since childhood     Past Surgical History:  has a past surgical history that includes Appendectomy; Arm Surgery (Left); Liposuction (Bilateral); Cataract extraction; Upper gastrointestinal endoscopy; and Colonoscopy.  Social History:  reports that she has never smoked. She has never used smokeless tobacco. She reports current alcohol use. She reports that she does not use drugs.  Family History: family history includes Alcohol Abuse in her father; Heart Failure in her brother; Stroke in her mother; Thyroid Disease in her mother.   Allergies: Psudatabs [pseudoephedrine hcl] and Shellfish-derived products    Unless otherwise stated in this report the patient's positive and negative responses for review of systems for constitutional, eyes, ENT, cardiovascular, respiratory, gastrointestinal, neurological, , musculoskeletal, and integument systems and related systems to the presenting problem are either stated in the history of present illness or were not pertinent or were  good balance

## 2025-09-03 ENCOUNTER — OFFICE VISIT (OUTPATIENT)
Dept: PRIMARY CARE CLINIC | Age: 70
End: 2025-09-03

## 2025-09-03 VITALS
DIASTOLIC BLOOD PRESSURE: 64 MMHG | SYSTOLIC BLOOD PRESSURE: 100 MMHG | TEMPERATURE: 98.2 F | HEART RATE: 73 BPM | HEIGHT: 67 IN | OXYGEN SATURATION: 97 % | BODY MASS INDEX: 25.11 KG/M2 | WEIGHT: 160 LBS

## 2025-09-03 DIAGNOSIS — R73.01 IMPAIRED FASTING GLUCOSE: ICD-10-CM

## 2025-09-03 DIAGNOSIS — E55.9 VITAMIN D INSUFFICIENCY: ICD-10-CM

## 2025-09-03 DIAGNOSIS — I10 ESSENTIAL HYPERTENSION: ICD-10-CM

## 2025-09-03 DIAGNOSIS — K21.9 GASTROESOPHAGEAL REFLUX DISEASE WITHOUT ESOPHAGITIS: Chronic | ICD-10-CM

## 2025-09-03 DIAGNOSIS — R53.83 OTHER FATIGUE: ICD-10-CM

## 2025-09-03 DIAGNOSIS — G47.10 HYPERSOMNOLENCE: ICD-10-CM

## 2025-09-03 DIAGNOSIS — Z00.00 MEDICARE ANNUAL WELLNESS VISIT, SUBSEQUENT: Primary | ICD-10-CM

## 2025-09-03 RX ORDER — FAMOTIDINE 20 MG/1
20 TABLET, FILM COATED ORAL 2 TIMES DAILY
Qty: 180 TABLET | Refills: 3 | Status: SHIPPED | OUTPATIENT
Start: 2025-09-03

## 2025-09-03 RX ORDER — AMLODIPINE BESYLATE 10 MG/1
10 TABLET ORAL DAILY
Qty: 90 TABLET | Refills: 3 | Status: SHIPPED | OUTPATIENT
Start: 2025-09-03

## 2025-09-03 SDOH — ECONOMIC STABILITY: FOOD INSECURITY: WITHIN THE PAST 12 MONTHS, THE FOOD YOU BOUGHT JUST DIDN'T LAST AND YOU DIDN'T HAVE MONEY TO GET MORE.: NEVER TRUE

## 2025-09-03 SDOH — ECONOMIC STABILITY: FOOD INSECURITY: WITHIN THE PAST 12 MONTHS, YOU WORRIED THAT YOUR FOOD WOULD RUN OUT BEFORE YOU GOT MONEY TO BUY MORE.: NEVER TRUE

## 2025-09-03 ASSESSMENT — PATIENT HEALTH QUESTIONNAIRE - PHQ9
1. LITTLE INTEREST OR PLEASURE IN DOING THINGS: NOT AT ALL
SUM OF ALL RESPONSES TO PHQ QUESTIONS 1-9: 0
2. FEELING DOWN, DEPRESSED OR HOPELESS: NOT AT ALL

## 2025-09-03 ASSESSMENT — SLEEP AND FATIGUE QUESTIONNAIRES
HOW LIKELY ARE YOU TO NOD OFF OR FALL ASLEEP WHILE WATCHING TV: MODERATE CHANCE OF DOZING
HOW LIKELY ARE YOU TO NOD OFF OR FALL ASLEEP WHILE SITTING AND TALKING TO SOMEONE: WOULD NEVER DOZE
ESS TOTAL SCORE: 6
HOW LIKELY ARE YOU TO NOD OFF OR FALL ASLEEP WHILE LYING DOWN TO REST IN THE AFTERNOON WHEN CIRCUMSTANCES PERMIT: HIGH CHANCE OF DOZING
HOW LIKELY ARE YOU TO NOD OFF OR FALL ASLEEP WHILE SITTING QUIETLY AFTER LUNCH WITHOUT ALCOHOL: WOULD NEVER DOZE
HOW LIKELY ARE YOU TO NOD OFF OR FALL ASLEEP WHILE SITTING AND READING: SLIGHT CHANCE OF DOZING
HOW LIKELY ARE YOU TO NOD OFF OR FALL ASLEEP IN A CAR, WHILE STOPPED FOR A FEW MINUTES IN TRAFFIC: WOULD NEVER DOZE
HOW LIKELY ARE YOU TO NOD OFF OR FALL ASLEEP WHEN YOU ARE A PASSENGER IN A CAR FOR AN HOUR WITHOUT A BREAK: WOULD NEVER DOZE
HOW LIKELY ARE YOU TO NOD OFF OR FALL ASLEEP WHILE SITTING INACTIVE IN A PUBLIC PLACE: WOULD NEVER DOZE

## 2025-09-03 ASSESSMENT — LIFESTYLE VARIABLES
HOW MANY STANDARD DRINKS CONTAINING ALCOHOL DO YOU HAVE ON A TYPICAL DAY: 1 OR 2
HOW OFTEN DO YOU HAVE A DRINK CONTAINING ALCOHOL: MONTHLY OR LESS